# Patient Record
Sex: FEMALE | Race: WHITE
[De-identification: names, ages, dates, MRNs, and addresses within clinical notes are randomized per-mention and may not be internally consistent; named-entity substitution may affect disease eponyms.]

---

## 2018-12-06 ENCOUNTER — HOSPITAL ENCOUNTER (OUTPATIENT)
Dept: HOSPITAL 75 - PREOP | Age: 38
Discharge: HOME | End: 2018-12-06
Attending: OBSTETRICS & GYNECOLOGY
Payer: COMMERCIAL

## 2018-12-06 VITALS — WEIGHT: 250 LBS | HEIGHT: 66 IN | BODY MASS INDEX: 40.18 KG/M2

## 2018-12-06 VITALS — DIASTOLIC BLOOD PRESSURE: 67 MMHG | SYSTOLIC BLOOD PRESSURE: 114 MMHG

## 2018-12-06 DIAGNOSIS — Z11.2: ICD-10-CM

## 2018-12-06 DIAGNOSIS — N94.6: ICD-10-CM

## 2018-12-06 DIAGNOSIS — N92.0: ICD-10-CM

## 2018-12-06 DIAGNOSIS — N81.4: ICD-10-CM

## 2018-12-06 DIAGNOSIS — Z01.812: Primary | ICD-10-CM

## 2018-12-06 LAB
APTT PPP: YELLOW S
BACTERIA #/AREA URNS HPF: (no result) /HPF
BASOPHILS # BLD AUTO: 0.1 10^3/UL (ref 0–0.1)
BASOPHILS NFR BLD AUTO: 1 % (ref 0–10)
BILIRUB UR QL STRIP: NEGATIVE
EOSINOPHIL # BLD AUTO: 0.1 10^3/UL (ref 0–0.3)
EOSINOPHIL NFR BLD AUTO: 1 % (ref 0–10)
ERYTHROCYTE [DISTWIDTH] IN BLOOD BY AUTOMATED COUNT: 14.5 % (ref 10–14.5)
FIBRINOGEN PPP-MCNC: CLEAR MG/DL
GLUCOSE UR STRIP-MCNC: NEGATIVE MG/DL
HCT VFR BLD CALC: 44 % (ref 35–52)
HGB BLD-MCNC: 15 G/DL (ref 11.5–16)
KETONES UR QL STRIP: NEGATIVE
LEUKOCYTE ESTERASE UR QL STRIP: (no result)
LYMPHOCYTES # BLD AUTO: 2.2 X 10^3 (ref 1–4)
LYMPHOCYTES NFR BLD AUTO: 21 % (ref 12–44)
MANUAL DIFFERENTIAL PERFORMED BLD QL: NO
MCH RBC QN AUTO: 30 PG (ref 25–34)
MCHC RBC AUTO-ENTMCNC: 34 G/DL (ref 32–36)
MCV RBC AUTO: 88 FL (ref 80–99)
MONOCYTES # BLD AUTO: 1.1 X 10^3 (ref 0–1)
MONOCYTES NFR BLD AUTO: 10 % (ref 0–12)
NEUTROPHILS # BLD AUTO: 6.9 X 10^3 (ref 1.8–7.8)
NEUTROPHILS NFR BLD AUTO: 67 % (ref 42–75)
NITRITE UR QL STRIP: NEGATIVE
PH UR STRIP: 5 [PH] (ref 5–9)
PLATELET # BLD: 312 10^3/UL (ref 130–400)
PMV BLD AUTO: 9.7 FL (ref 7.4–10.4)
PROT UR QL STRIP: NEGATIVE
RBC # BLD AUTO: 4.98 10^6/UL (ref 4.35–5.85)
RBC #/AREA URNS HPF: (no result) /HPF
SP GR UR STRIP: 1.02 (ref 1.02–1.02)
UROBILINOGEN UR-MCNC: NORMAL MG/DL
WBC # BLD AUTO: 10.3 10^3/UL (ref 4.3–11)
WBC #/AREA URNS HPF: (no result) /HPF

## 2018-12-06 PROCEDURE — 87081 CULTURE SCREEN ONLY: CPT

## 2018-12-06 PROCEDURE — 86850 RBC ANTIBODY SCREEN: CPT

## 2018-12-06 PROCEDURE — 85025 COMPLETE CBC W/AUTO DIFF WBC: CPT

## 2018-12-06 PROCEDURE — 86900 BLOOD TYPING SEROLOGIC ABO: CPT

## 2018-12-06 PROCEDURE — 36415 COLL VENOUS BLD VENIPUNCTURE: CPT

## 2018-12-06 PROCEDURE — 87088 URINE BACTERIA CULTURE: CPT

## 2018-12-06 PROCEDURE — 81000 URINALYSIS NONAUTO W/SCOPE: CPT

## 2018-12-06 PROCEDURE — 86901 BLOOD TYPING SEROLOGIC RH(D): CPT

## 2018-12-11 ENCOUNTER — HOSPITAL ENCOUNTER (OUTPATIENT)
Dept: HOSPITAL 75 - SDC | Age: 38
Discharge: HOME | End: 2018-12-11
Attending: OBSTETRICS & GYNECOLOGY
Payer: MEDICAID

## 2018-12-11 VITALS — SYSTOLIC BLOOD PRESSURE: 140 MMHG | DIASTOLIC BLOOD PRESSURE: 85 MMHG

## 2018-12-11 VITALS — WEIGHT: 250 LBS | BODY MASS INDEX: 40.18 KG/M2 | HEIGHT: 66 IN

## 2018-12-11 VITALS — DIASTOLIC BLOOD PRESSURE: 89 MMHG | SYSTOLIC BLOOD PRESSURE: 148 MMHG

## 2018-12-11 VITALS — DIASTOLIC BLOOD PRESSURE: 58 MMHG | SYSTOLIC BLOOD PRESSURE: 126 MMHG

## 2018-12-11 DIAGNOSIS — F17.210: ICD-10-CM

## 2018-12-11 DIAGNOSIS — N87.0: ICD-10-CM

## 2018-12-11 DIAGNOSIS — N94.5: ICD-10-CM

## 2018-12-11 DIAGNOSIS — J45.909: ICD-10-CM

## 2018-12-11 DIAGNOSIS — E66.01: ICD-10-CM

## 2018-12-11 DIAGNOSIS — Z79.899: ICD-10-CM

## 2018-12-11 DIAGNOSIS — N81.2: ICD-10-CM

## 2018-12-11 DIAGNOSIS — D25.2: ICD-10-CM

## 2018-12-11 DIAGNOSIS — N83.8: ICD-10-CM

## 2018-12-11 DIAGNOSIS — N92.0: Primary | ICD-10-CM

## 2018-12-11 PROCEDURE — 94664 DEMO&/EVAL PT USE INHALER: CPT

## 2018-12-11 PROCEDURE — 86901 BLOOD TYPING SEROLOGIC RH(D): CPT

## 2018-12-11 PROCEDURE — 86850 RBC ANTIBODY SCREEN: CPT

## 2018-12-11 PROCEDURE — 84703 CHORIONIC GONADOTROPIN ASSAY: CPT

## 2018-12-11 PROCEDURE — 86900 BLOOD TYPING SEROLOGIC ABO: CPT

## 2018-12-11 PROCEDURE — 36415 COLL VENOUS BLD VENIPUNCTURE: CPT

## 2018-12-11 PROCEDURE — 88307 TISSUE EXAM BY PATHOLOGIST: CPT

## 2018-12-11 RX ADMIN — SODIUM CHLORIDE, SODIUM LACTATE, POTASSIUM CHLORIDE, AND CALCIUM CHLORIDE PRN MLS/HR: 600; 310; 30; 20 INJECTION, SOLUTION INTRAVENOUS at 10:07

## 2018-12-11 RX ADMIN — HYDROCODONE BITARTRATE AND ACETAMINOPHEN PRN EA: 7.5; 325 TABLET ORAL at 15:32

## 2018-12-11 RX ADMIN — SODIUM CHLORIDE, SODIUM LACTATE, POTASSIUM CHLORIDE, AND CALCIUM CHLORIDE PRN MLS/HR: 600; 310; 30; 20 INJECTION, SOLUTION INTRAVENOUS at 08:10

## 2018-12-11 RX ADMIN — HYDROCODONE BITARTRATE AND ACETAMINOPHEN PRN EA: 7.5; 325 TABLET ORAL at 15:21

## 2018-12-11 RX ADMIN — SODIUM CHLORIDE, SODIUM LACTATE, POTASSIUM CHLORIDE, AND CALCIUM CHLORIDE PRN MLS/HR: 600; 310; 30; 20 INJECTION, SOLUTION INTRAVENOUS at 06:54

## 2018-12-11 NOTE — XMS REPORT
[facility]

 Created on: 2018



Kori Barksdale

External Reference #: [id number]

: 1980

Sex: Female



Demographics







 Address  [address] [place]  [address]

 

 Preferred Language  Unknown

 

 Marital Status  Unknown

 

 Rastafarian Affiliation  Unknown

 

 Race  Unknown

 

 Ethnic Group  Unknown





Author







 Author  HERBER AZEVEDO

 

 [facility]

 

 Address  [address] [place]  [address]



 

 Phone  [phone]







Care Team Providers







 Care Team Member Name  Role  Phone

 

 HERBER AZEVEDO  Unavailable  [phone]







PROBLEMS







 Type  Condition  ICD9-CM Code  DLL82-GW Code  Onset Dates  Condition Status  
SNOMED Code

 

 Problem  Strep throat     J02.0     Active  67888151

 

 Problem  Bipolar depression     F31.30     Active  53560144

 

 Problem  Social anxiety disorder     F40.10     Active  32990695

 

 Problem  Moderate major depression     F32.1     Active  588909

 

 Problem  Meralgia paresthetica of left side     G57.12     Active  93961461

 

 Problem  Congenital defect of spine     Q76.49     Active  582369884

 

 Problem  Recurrent major depressive disorder, in partial remission     F33.41 
    Active  02751083







ALLERGIES







 Substance  Reaction  Event Type  Date  Status

 

 Depo-Provera  fluid retention  Drug Allergy    Active







ENCOUNTERS







 Encounter  Location  Date  Diagnosis

 

 [facility]  [address], [place] [address]
50[address]  07 Aug, 2018   

 

 [facility]  [id number] [address]-
2877    Bipolar depression F31.30 and Social anxiety disorder F40.10

 

 [facility]  [address] [id number], [place] [id number]  
15 Justo, 2018  Congenital defect of spine Q76.49

 

 [facility]  [address] [id number], [place] [id number]  
  Moderate major depression F32.1

 

 [facility]  [address] [facility] [address] [id number], [place] [id number]  
22 May, 2018  Recurrent major depressive disorder, in partial remission F33.41

 

 [facility]  [address] [id number], [place] [id number]  
09 May, 2018  Recurrent major depressive disorder, in partial remission F33.41

 

 [facility]  [address] [id number], [place] [id number]  
04 May, 2018  Moderate major depression F32.1 and Thrush B37.0

 

 [facility]  [address] [id number], [place] [id number]  
   

 

 [facility]  [address] [id number], [place] [id number]  
  Moderate major depression F32.1

 

 [facility]  [address] [id number], [place] [address]  
  Moderate major depression F32.1 and Thrush B37.0

 

 [facility]  [address] [id number], [place] [id number]  
   

 

 [facility]  [address] [place] [address]    Right acute otitis media H66.91 ; Cough R05 and Tobacco 
abuse Z72.0

 

 [facility]  [address] [facility] [address] [id number], [place] [id number]  
  Moderate major depression F32.1 and Acute hemorrhoid K64.9

 

 [facility]  [address] [facility] [address] [id number], [place] [id number]  
14 Dec, 2017  Moderate major depression F32.1 ; History of asthma Z87.09 ; 
Tobacco use Z72.0 ; Tobacco abuse counseling Z71.6 ; Meralgia paresthetica of 
left side G57.12 ; Sore throat J02.9 and Strep throat J02.0

 

 [facility] [id number], [place] [id number]  
12 Dec, 2017   

 

 [facility]  [id number] [place] [address]
-[phone]    Pharyngitis due to other organism J02.8

 

 [facility]  [id number] [place] [address]-
5351  28 Mar, 2017   

 

 [facility]  [address] [id number] [address]
7[address]  14 Mar, 2017  Routine gynecological examination Z01.419

 

 [facility] [address] [id number] [place] [id number]  
04 Mar, 2017  Acute recurrent frontal sinusitis J01.11 and Fever and chills 
R50.9

 

 [facility]  [address]00565100[place], [place] [address]  10 2017  Fever, unspecified fever cause R50.9 and Acute non-
recurrent maxillary sinusitis J01.00

 

 [facility]  [id number] [place] [address]-
4861  03 Oct, 2016   

 

 [facility] [address] [id number] [place] [id number]  
  Strep throat J02.0

 

 [address] [place] [id number]  
  Conjunctivitis of right eye, unspecified conjunctivitis type 
H10.9 and Strep throat J02.0

 

 [address] [id number] [place] [id number]  
  Sore throat J02.9

 

 [facility]  [address]00565100[place], [place] [address]  17 Dec, 2009   

 

 [address] [place] [address]-
3829  17 Dec, 2009   

 

 [facility]  [id number] [place] [address]-
9251  19 May, 2009   







IMMUNIZATIONS

No Known Immunizations



SOCIAL HISTORY

Never Assessed



REASON FOR VISIT

Depression-c/o decreased motivation, crying, the last couple weeks.  Karine CALIX



PLAN OF CARE







 Activity  Details









  









 Follow Up  4 Weeks Reason:depression







VITAL SIGNS







 Height  66 in  2018

 

 Weight  241.8 lbs  2018

 

 Temperature  99.0 degrees Fahrenheit  2018

 

 Heart Rate  94 bpm  2018

 

 Respiratory Rate  18   2018

 

 BMI  39.02 kg/m2  2018

 

 Blood pressure systolic  133 mmHg  2018

 

 Blood pressure diastolic  83 mmHg  2018







MEDICATIONS







 Medication  Instructions  Dosage  Frequency  Start Date  End Date  Duration  
Status

 

 Nystatin 409476 UNIT/ML  Mouth/Throat Four times a day  4 ml  6h    14 days  Active

 

 Pristiq 50 mg  Orally Once a day  1 tablet  24h  14 Dec, 2017        Active

 

 ProAir  (90 Base) MCG/ACT  Inhalation every 4 hrs  2 puffs as needed  
4h           Active

 

 Aripiprazole 5 mg  Orally Once a day  1 tablet  24h          Active







RESULTS

No Results



PROCEDURES

No Known procedures



INSTRUCTIONS





MEDICATIONS ADMINISTERED

No Known Medications



MEDICAL (GENERAL) HISTORY







 Type  Description  Date

 

 Medical History  asthma   

 

 Medical History  depression ( prozac, pristiq, zoloft, celexa, wellbutrin)   

 

 Medical History  anxiety   

 

 Medical History  Chronic low back pain- missing vertebra   

 

 Medical History  Spina befida - close   

 

 Medical History  Degenerative disc disease   

 

 Surgical History  cholecystectomy   

 

 Hospitalization History  childbirth

## 2018-12-11 NOTE — XMS REPORT
[facility]

 Created on: 2018



Kori Barksdale

External Reference #: [id number]

: 1980

Sex: Female



Demographics







 Address  [address] [place]  [address]

 

 Preferred Language  Unknown

 

 Marital Status  Unknown

 

 Restoration Affiliation  Unknown

 

 Race  Unknown

 

 Ethnic Group  Unknown





Author







 Author  HERBER AZEVEDO

 

 [facility]

 

 Address  [address] [place]  [address]



 

 Phone  [phone]







Care Team Providers







 Care Team Member Name  Role  Phone

 

 HERBER AZEVEDO  Unavailable  [phone]







PROBLEMS







 Type  Condition  ICD9-CM Code  ATY06-CB Code  Onset Dates  Condition Status  
SNOMED Code

 

 Problem  Strep throat     J02.0     Active  40003760

 

 Problem  Bipolar depression     F31.30     Active  09777402

 

 Problem  Social anxiety disorder     F40.10     Active  85357095

 

 Problem  Moderate major depression     F32.1     Active  294987

 

 Problem  Meralgia paresthetica of left side     G57.12     Active  68713151

 

 Problem  Congenital defect of spine     Q76.49     Active  340544567

 

 Problem  Recurrent major depressive disorder, in partial remission     F33.41 
    Active  98771734







ALLERGIES







 Substance  Reaction  Event Type  Date  Status

 

 Depo-Provera  fluid retention  Drug Allergy  04 May, 2018  Active







ENCOUNTERS







 Encounter  Location  Date  Diagnosis

 

 [facility]  [address]00565100[place] [address]  20 Sep, 2018   

 

 [facility]  [address] [place] [address]-
2886  07 Aug, 2018  Bipolar depression F31.30 and Social anxiety disorder F40.10

 

 [address]  [address] [id number], [place] [id number]  
04 Aug, 2018  Hordeolum internum of right upper eyelid H00.021

 

 [facility]  [id number] [address]-
3271    Bipolar depression F31.30 and Social anxiety disorder F40.10

 

 [facility]  [address] [id number], [place] [id number]  
15 Justo, 2018  Congenital defect of spine Q76.49

 

 [address] [address]  [address] [id number], [place] [id number]  
  Moderate major depression F32.1

 

 [facility]  [address] [id number], [place] [id number]  
22 May, 2018  Recurrent major depressive disorder, in partial remission F33.41

 

 [facility]  [address] [id number], [place] [id number]  
09 May, 2018  Recurrent major depressive disorder, in partial remission F33.41

 

 [facility]  [address] [id number], [place] [id number]  
04 May, 2018  Moderate major depression F32.1 and Thrush B37.0

 

 [facility]  [address] [id number], [place] [id number]  
   

 

 [facility]  [address] [id number], [place] [id number]  
  Moderate major depression F32.1

 

 [facility]  [address] [id number], [place] [id number]  
  Moderate major depression F32.1 and Thrush B37.0

 

 [facility]  [address] [id number], [place] [id number]  
   

 

 [facility]  [address] [facility] [id number] [place] [address]    Right acute otitis media H66.91 ; Cough R05 and Tobacco 
abuse Z72.0

 

 [facility]  [address] [id number], [place] [address]  
  Moderate major depression F32.1 and Acute hemorrhoid K64.9

 

 [facility]  [address] [id number], [place] [address]  
14 Dec, 2017  Moderate major depression F32.1 ; History of asthma Z87.09 ; 
Tobacco use Z72.0 ; Tobacco abuse counseling Z71.6 ; Meralgia paresthetica of 
left side G57.12 ; Sore throat J02.9 and Strep throat J02.0

 

 [facility]  [address] [id number], [place] [address]  
12 Dec, 2017   

 

 [facility]  [address]00565100[place] [address]    Pharyngitis due to other organism J02.8

 

 [facility]  [address]00565100[place] [address]-
2430  28 Mar, 2017   

 

 [facility]  [id number] [place] [address]-
2377  14 Mar, 2017  Routine gynecological examination Z01.419

 

 [address] [id number], [place] [address]  
04 Mar, 2017  Acute recurrent frontal sinusitis J01.11 and Fever and chills 
R50.9

 

 [facility]  [address]00565100[place] [address]
-[address]  10 2017  Fever, unspecified fever cause R50.9 and Acute non-
recurrent maxillary sinusitis J01.00

 

 [address]00565100[place] [address]-
[address]32  03 Oct, 2016   

 

 [address] [id number], [place] [address]  
  Strep throat J02.0

 

 [address] [id number], [place] [id number]  
  Conjunctivitis of right eye, unspecified conjunctivitis type 
H10.9 and Strep throat J02.0

 

 [address] [id number], [place] [id number]  
  Sore throat J02.9

 

 [address]00565100[place] [address]-
6463  17 Dec, 2009   

 

 [address]00565100[place] [address]-
0095  17 Dec, 2009   

 

 [facility]  [address]00565100[place] [address]-
6337  19 May, 2009   







IMMUNIZATIONS

No Known Immunizations



SOCIAL HISTORY

Never Assessed



REASON FOR VISIT

Depression check up, doing better after going to QOD on Abilify, still has some 
nausea, Pristiq working well with Olivefy---JEREL caal



PLAN OF CARE







 Activity  Details









  









 Follow Up  1 Year, prn Reason:







VITAL SIGNS







 Height  66 in  2018

 

 Weight  238.8 lbs  2018

 

 Temperature  97.9 degrees Fahrenheit  2018

 

 Heart Rate  102 bpm  2018

 

 Respiratory Rate  18   2018

 

 BMI  38.54 kg/m2  2018

 

 Blood pressure systolic  130 mmHg  2018

 

 Blood pressure diastolic  68 mmHg  2018







MEDICATIONS







 Medication  Instructions  Dosage  Frequency  Start Date  End Date  Duration  
Status

 

 Pristiq 50 mg  Orally Once a day  1 tablet  24h  14 Dec, 2017        Active

 

 Diflucan 150 MG  Orally take today and repeat in 3 days  1 tablet     04 May, 
2018        Active

 

 ProAir  (90 Base) MCG/ACT  Inhalation every 4 hrs  2 puffs as needed  
4h           Active

 

 Nystatin 639701 UNIT/ML  Mouth/Throat Four times a day  4 ml  6h  
     14 days  Active

 

 Aripiprazole 5 mg  Orally every other day  1 tablet          90 
days  Active







RESULTS

No Results



PROCEDURES

No Known procedures



INSTRUCTIONS





MEDICATIONS ADMINISTERED

No Known Medications



MEDICAL (GENERAL) HISTORY







 Type  Description  Date

 

 Medical History  asthma   

 

 Medical History  depression ( prozac, pristiq, zoloft, celexa, wellbutrin)   

 

 Medical History  anxiety   

 

 Medical History  Chronic low back pain- missing vertebra   

 

 Medical History  Spina befida - close   

 

 Medical History  Degenerative disc disease   

 

 Surgical History  cholecystectomy   

 

 Hospitalization History  childbirth

## 2018-12-11 NOTE — XMS REPORT
[facility]

 Created on: 2018



Kori Barksdale

External Reference #: [id number]

: 1980

Sex: Female



Demographics







 Address  [address] [place]  [address]

 

 Preferred Language  Unknown

 

 Marital Status  Unknown

 

 Mandaen Affiliation  Unknown

 

 Race  Unknown

 

 Ethnic Group  Unknown





Author







 Author  BELA MCKEON

 

 [facility]

 

 Address  [address] [place]  [address]



 

 Phone  [phone]







Care Team Providers







 Care Team Member Name  Role  Phone

 

 LINDA  BELA  Unavailable  [phone]







PROBLEMS







 Type  Condition  ICD9-CM Code  VPQ01-NL Code  Onset Dates  Condition Status  
SNOMED Code

 

 Problem  Strep throat     J02.0     Active  60198843

 

 Problem  Bipolar depression     F31.30     Active  39329244

 

 Problem  Social anxiety disorder     F40.10     Active  88549339

 

 Problem  Moderate major depression     F32.1     Active  836431

 

 Problem  Meralgia paresthetica of left side     G57.12     Active  91188405

 

 Problem  Congenital defect of spine     Q76.49     Active  439330808

 

 Problem  Recurrent major depressive disorder, in partial remission     F33.41 
    Active  73100375







ALLERGIES







 Substance  Reaction  Event Type  Date  Status

 

 Depo-Provera  fluid retention  Drug Allergy    Active

 

 Abilify  GI distress  Drug Allergy    Active







ENCOUNTERS







 Encounter  Location  Date  Diagnosis

 

 [facility]  [address] [place] [address]-
3128  20 Sep, 2018   

 

 [facility]  [address] [place] [address]-
[address]  29 Aug, 2018  Bipolar depression F31.30

 

 [facility]  [id number] [place] [address]-
[address]  07 Aug, 2018  Bipolar depression F31.30 and Social anxiety disorder F40.10

 

 [address]  [address] [id number], [place] [id number]  
04 Aug, 2018  Hordeolum internum of right upper eyelid H00.021

 

 [facility]  [address] [place] [address]-
5532    Bipolar depression F31.30 and Social anxiety disorder F40.10

 

 [facility]  [address] [id number], [place] [address]  
15 Justo, 2018  Congenital defect of spine Q76.49

 

 [facility]  [address]  [address] [id number], [place] [id number]  
  Moderate major depression F32.1

 

 [facility]  [address]  [address] [id number], [place] [address]  
22 May, 2018  Recurrent major depressive disorder, in partial remission F33.41

 

 [facility]  [address] [id number], [place] [address]  
09 May, 2018  Recurrent major depressive disorder, in partial remission F33.41

 

 [facility]  [address]  [address] [id number], [place] [id number]  
04 May, 2018  Moderate major depression F32.1 and Thrush B37.0

 

 [facility]  [address]  [address] [id number], [place] [id number]  
   

 

 [facility]  [address]  [address] [id number], [place] [id number]  
  Moderate major depression F32.1

 

 [facility]  [address]  [address] [id number], [place] [id number]  
  Moderate major depression F32.1 and Thrush B37.0

 

 [facility]  [address]  [address] [id number], [place] [address]  
   

 

 [facility]  [address] [facility] [id number], [place] [address]
-[address]    Right acute otitis media H66.91 ; Cough R05 and Tobacco 
abuse Z72.0

 

 [facility]  [address]  [address] [id number], [place] [id number]  
  Moderate major depression F32.1 and Acute hemorrhoid K64.9

 

 [facility]  [address] [id number], [place] [id number]  
14 Dec, 2017  Moderate major depression F32.1 ; History of asthma Z87.09 ; 
Tobacco use Z72.0 ; Tobacco abuse counseling Z71.6 ; Meralgia paresthetica of 
left side G57.12 ; Sore throat J02.9 and Strep throat J02.0

 

 [address]00565100[place], [place] [id number]  
12 Dec, 2017   

 

 [facility]  [address] [place] [address]    Pharyngitis due to other organism J02.8

 

 [facility]  [id number] [place] [address]-
2341  28 Mar, 2017   

 

 [place] [id number] [place] [address]-
3921  14 Mar, 2017  Routine gynecological examination Z01.419

 

 [address] [place] [id number]  
04 Mar, 2017  Acute recurrent frontal sinusitis J01.11 and Fever and chills 
R50.9

 

 [facility]  [id number] [place] [address]
-[address]  10 2017  Fever, unspecified fever cause R50.9 and Acute non-
recurrent maxillary sinusitis J01.00

 

 [id number] [place] [address]-
6188  03 Oct, 2016   

 

 [address] [place] [id number]  
  Strep throat J02.0

 

 [address] [place] [id number]  
  Conjunctivitis of right eye, unspecified conjunctivitis type 
H10.9 and Strep throat J02.0

 

 [address] [place] [id number]  
  Sore throat J02.9

 

 [facility]  [address] [place] [address]-
6490  17 Dec, 2009   

 

 [facility]  [id number] [place] [address]-
0865  17 Dec, 2009   

 

 [facility]  [address] [facility] [id number] [place] [address]-
4031  19 May, 2009   







IMMUNIZATIONS

No Known Immunizations



SOCIAL HISTORY

Never Assessed



REASON FOR VISIT

BH intake WB-MA



PLAN OF CARE







 Activity  Details









  









 Follow Up  4 Weeks Reason:







VITAL SIGNS







 Height  67 in  2018

 

 Weight  238 lbs  2018

 

 Heart Rate  84 bpm  2018

 

 Respiratory Rate  18   2018

 

 BMI  37.27 kg/m2  2018

 

 Blood pressure systolic  128 mmHg  2018

 

 Blood pressure diastolic  80 mmHg  2018







MEDICATIONS







 Medication  Instructions  Dosage  Frequency  Start Date  End Date  Duration  
Status

 

 Pristiq 50 mg  Orally Once a day  1 tablet  24h  14 Dec, 2017        Active

 

 Lamictal 25 MG  Orally for 2 weeks. On  take 2 tablets at bedtime and 
continue  1 tablets          30 day(s)  Active

 

 ProAir  (90 Base) MCG/ACT  Inhalation every 4 hrs  2 puffs as needed  
4h           Active







RESULTS

No Results



PROCEDURES

No Known procedures



INSTRUCTIONS





MEDICATIONS ADMINISTERED

No Known Medications



MEDICAL (GENERAL) HISTORY







 Type  Description  Date

 

 Medical History  asthma   

 

 Medical History  depression ( prozac, pristiq, zoloft, celexa, wellbutrin)   

 

 Medical History  anxiety   

 

 Medical History  Chronic low back pain- missing vertebra   

 

 Medical History  Spina befida - close   

 

 Medical History  Degenerative disc disease   

 

 Surgical History  cholecystectomy   

 

 Hospitalization History  childbirth

## 2018-12-11 NOTE — XMS REPORT
Continuity of Care Document

 Created on: 2018



CLEVELAND ACOSTA

External Reference #: [id number]

: 1980

Sex: Female



Demographics







 Address  [address]

[place]  [address]

 

 Home Phone  [phone] x

 

 Preferred Language  Unknown

 

 Marital Status  Unknown

 

 Catholic Affiliation  Unknown

 

 Race  Unknown

 

 Ethnic Group  Unknown





Author







 Author  [facility]

 

 Organization  [facility]

 

 Address  Unknown

 

 Phone  Unavailable



              



Allergies

      





 Active            Description            Code            Type            
Severity            Reaction            Onset            Reported/Identified   
         Relationship to Patient            Clinical Status        

 

 Yes            erythromycin base            [id number]            Drug Allergy
            Unknown            N/A                         2013          
                        

 

 Yes            medroxyprogesterone acetate            [id number]            
Drug Allergy            Unknown            N/A                         2013                                  



                    



Medications

      



There is no data.                  



Problems

      





 Date Dx Coded            Attending            Type            Code            
Diagnosis            Diagnosed By        

 

 2011                         Ot            847.0            SPRAIN OF 
NECK                     

 

 2011                         Ot            850.0            CONCUSSION W/
O COMA                     

 

 2011                         Ot            959.01            HEAD INJURY
, NOS                     

 

 2011                         Ot            E000.8            OTHER 
EXTERNAL CAUSE STATUS                     

 

 2011                         Ot            E812.0            MV 
COLLISION NOS-                     

 

 2013            PALOMO KITCHEN MD            Ot            640.03      
      THREATEN ABORT-ANTEPART                     

 

 2013            PALOMO KITCHEN MD            Ot            643.03      
      MILD HYPEREMESIS-ANTEPAR                     

 

 2013            PALOMO KITCHEN MD            Ot            649.53      
      SPOTTING COMP PREGNANCY, ANTEPARTUM COND                     

 

 10/05/2013            RANCHO JONES MD            Ot            
644.03            THRT DANNY LABOR-ANTEPART                     

 

 2013            RAJNI TAYLOR DO            Ot            623.5     
       NONINFECT VAG LEUKORRHEA                     

 

 2013            RAJNI TAYLOR DO            Ot            625.9     
       FEM GENITAL SYMPTOMS NOS                     

 

 2013            RAJNI TAYLOR DO            Ot            646.83    
        PREG COMPL NEC-ANTEPART                     

 

 2013            RAJNI TAYLOR DO            Ot            648.83    
        ABN GLUCOSE-ANTEPARTUM                     

 

 2013            RAJNI TAYLOR DO            Ot            654.73    
        ABNORM VAGINA-ANTEPARTUM                     

 

 2013            RAJNI TAYLOR DO            Ot            V23.41    
        PREG W HISTORY OF PRE-TERM LABOR                     

 

 2013            RANCOH JONES MD            Ot            
288.60            LEUKOCYTOSIS, UNSPECIFIED                     

 

 2013            RANCHO JONES MD            Ot            
644.03            THRT DANNY LABOR-ANTEPART                     

 

 2013            RANCHO JONES MD            Ot            
646.83            PREG COMPL NEC-ANTEPART                     

 

 2013            RANCHO JONES MD            Ot            
655.73            DECR FETAL MOVEMNT ANTEPARTUM CONDITION                      

 

 2013            RANCHO JONES MD, Ot            
V23.41            PREG W HISTORY OF PRE-TERM LABOR                     

 

 2013            RANCHO JONES MD            Ot            
644.03            THRT DANNY LABOR-ANTEPART                     

 

 2013            RANCHO JONES MD, Ot            
644.03            THRT DANNY LABOR-ANTEPART                     

 

 12/15/2013            RANCHO JONES MD, Ot            
657.01            POLYHYDRAMNIOS,DEL W OR W/O MENTN ANTEPA                     

 

 12/15/2013            RANCHO JONES MD            Ot            V27.0
            DELIVER-SINGLE LIVEBORN                     

 

 12/10/2018            QUIN ALTAMIRANO DO            Ot            N81.4        
    UTEROVAGINAL PROLAPSE, UNSPECIFIED                     

 

 12/10/2018            CHERELLE ALTAMIRANO DOA C            Ot            N92.0        
    EXCESSIVE AND FREQUENT MENSTRUATION WITH                     

 

 12/10/2018            CHERELLE ALTAIMRANO DOA C            Ot            N94.6        
    DYSMENORRHEA, UNSPECIFIED                     

 

 12/10/2018            CHERELLE ALTAMIRANO DOA PB            Ot            Z01.812      
      ENCOUNTER FOR PREPROCEDURAL LABORATORY E                     

 

 12/10/2018            QUIN ALTAMIRANO DO            Ot            Z11.2        
    ENCOUNTER FOR SCREENING FOR OTHER BACTER                     



                                                                          



Procedures

      





 Code            Description            Performed By            Performed On   
     

 

             73.59                                  MANUAL ASSIST DELIV NEC    
                               2013        



                  



Results

      





 Test            Result            Range        









 Pap Lb, HPV-hr - 17 11:20         









 HPV, high-risk            Negative             Negative        

 

 DIAGNOSIS:            Comment                      

 

 Specimen adequacy:            Comment                      

 

 Clinician provided ICD10:            Comment                      

 

 Performed by:            Comment                      

 

 Electronically signed by:            Comment                      

 

 .            .                      

 

 Note:            Comment                      









 Aerobic Bacterial Culture - 17 11:20         









 Aerobic Bacterial Culture            Note                      









 Genital Culture, Routine - 17 11:20         









 Genital Culture, Routine            Note                      









 THYROID ANALYZER - 17 09:26         









 TSH            1.24 mIU/L            NRG        









 Methicillin resistant Staphylococcus aureus (MRSA) screening culture -  11:55         









 Methicillin resistant Staphylococcus aureus (MRSA) screening culture          
  NEG             NRG        









 Complete blood count (CBC) with automated white blood cell (WBC) differential 
- 18 12:00         









 Blood leukocytes automated count (number/volume)            10.3 10*3/uL      
      4.3-11.0        

 

 Blood erythrocytes automated count (number/volume)            4.98 10*6/uL    
        4.35-5.85        

 

 Venous blood hemoglobin measurement (mass/volume)            15.0 g/dL        
    11.5-16.0        

 

 Blood hematocrit (volume fraction)            44 %            35-52        

 

 Automated erythrocyte mean corpuscular volume            88 [foz_us]          
  80-99        

 

 Automated erythrocyte mean corpuscular hemoglobin (mass per erythrocyte)      
      30 pg            25-34        

 

 Automated erythrocyte mean corpuscular hemoglobin concentration measurement (
mass/volume)            34 g/dL            32-36        

 

 Automated erythrocyte distribution width ratio            14.5 %            
10.0-14.5        

 

 Automated blood platelet count (count/volume)            312 10*3/uL          
  130-400        

 

 Automated blood platelet mean volume measurement            9.7 [foz_us]      
      7.4-10.4        

 

 Automated blood neutrophils/100 leukocytes            67 %            42-75   
     

 

 Automated blood lymphocytes/100 leukocytes            21 %            12-44   
     

 

 Blood monocytes/100 leukocytes            10 %            0-12        

 

 Automated blood eosinophils/100 leukocytes            1 %            0-10     
   

 

 Automated blood basophils/100 leukocytes            1 %            0-10        

 

 Blood neutrophils automated count (number/volume)            6.9 10*3         
   1.8-7.8        

 

 Blood lymphocytes automated count (number/volume)            2.2 10*3         
   1.0-4.0        

 

 Blood monocytes automated count (number/volume)            1.1 10*3            
0.0-1.0        

 

 Automated eosinophil count            0.1 10*3/uL            0.0-0.3        

 

 Automated blood basophil count (count/volume)            0.1 10*3/uL          
  0.0-0.1        









 Blood type T Indirect antibody screen panel - 18 12:00         









 ABO+Rh group            ABP             NRG        

 

 Blood group antibody screen            NEGATIVE             NRG        









 Complete urinalysis with reflex to culture - 18 12:03         









 Urine color determination            YELLOW             NRG        

 

 Urine clarity determination            CLEAR             NRG        

 

 Urine pH measurement by test strip            5             5-9        

 

 Specific gravity of urine by test strip            1.020             1.016-
1.022        

 

 Urine protein assay by test strip, semi-quantitative            NEGATIVE      
       NEGATIVE        

 

 Urine glucose detection by automated test strip            NEGATIVE           
  NEGATIVE        

 

 Erythrocytes detection in urine sediment by light microscopy            2+    
         NEGATIVE        

 

 Urine ketones detection by automated test strip            NEGATIVE           
  NEGATIVE        

 

 Urine nitrite detection by test strip            NEGATIVE             NEGATIVE
        

 

 Urine total bilirubin detection by test strip            NEGATIVE             
NEGATIVE        

 

 Urine urobilinogen measurement by automated test strip (mass/volume)          
  NORMAL             NORMAL        

 

 Urine leukocyte esterase detection by dipstick            2+             
NEGATIVE        

 

 Automated urine sediment erythrocyte count by microscopy (number/high power 
field)             [HPF]            NRG        

 

 Automated urine sediment leukocyte count by microscopy (number/high power field
)             [HPF]            NRG        

 

 Bacteria detection in urine sediment by light microscopy            FEW       
      NRG        

 

 Squamous epithelial cells detection in urine sediment by light microscopy     
       10-25             NRG        

 

 Crystals detection in urine sediment by light microscopy            NONE      
       NRG        

 

 Casts detection in urine sediment by light microscopy            NONE         
    NRG        

 

 Mucus detection in urine sediment by light microscopy            NEGATIVE     
        NRG        

 

 Complete urinalysis with reflex to culture            YES             NRG     
   









 Bacterial urine culture - 18 12:03         









 Bacterial urine culture            SEE REPORT             NRG        

 

 COLONY COUNT            .             NRG        



                                



Encounters

      





 ACCT No.            Visit Date/Time            Discharge            Status    
        Pt. Type            Provider            Facility            Loc./Unit  
          Complaint        

 

 [id number]            2018 11:37:00            2018 12:10:00    
        DIS            Outpatient            QUIN ALTAMIRANO DO            [facility]            PREOP            MENORRHAGIA; UTERINE 
PROLAPSE; DYSMENORRHEA        

 

 [id number]            2013 09:42:00            12/15/2013 18:15:00    
        DIS            Inpatient            RANCHO JONES MD          
  [facility]            WS            LABOR        

 

 [id number]            2013 07:20:00            2013 09:30:00    
        DIS            Outpatient            RANCHO JONES MD         
   [facility]            WSo            LEAKING FLUID/
BLOOD        

 

 [id number]            2013 14:23:00            2013 18:10:00    
        DIS            Outpatient            RANCHO JONES MD         
   [facility]            WSo            PELVIC PAIN AND 
PRESSURE        

 

 [id number]            2013 15:55:00            2013 10:00:00    
        DIS            Inpatient            RANCHO JONES MD          
  [facility]            WS            CONTRACTIONS        

 

 [id number]            2013 09:39:00            2013 13:35:00    
        DIS            Outpatient            RAJNI TAYLOR DO            
[facility]            PRESSURE        

 

 [id number]            10/05/2013 18:00:00            10/05/2013 21:57:00    
        DIS            Outpatient            RANCHO JONES MD         
   [facility]            WSo            PAIN        

 

 [id number]            2013 12:51:00            2013 23:59:59    
        CLS            Outpatient                                              
              

 

 [id number]            2013 11:59:00            2013 13:37:00    
        DIS            Emergency            IMTIAZ MELLO, PALOMO VALDEZ            [facility]            ER            7 WEEKS PREGNANT  
CRAMPING SPOTTING DEHRYDRATION        

 

 [id number]            2018 06:00:00                         ACT       
     Outpatient            QUIN ALTAMIRANO DO            [facility]            SDC            MENORRHAGIA; DYSMENORRHEA; UTERINE PROLAPSE
        

 

 [id number]            10/13/2015 10:50:00                                   
   Document Registration                                                       
     

 

 [id number]            10/13/2015 10:50:00                                   
   Document Registration                                                       
     

 

 [id number]            2017 18:11:00                                   
   Document Registration                                                       
     

 

 [id number]            2018 08:20:00            2018 23:59:59            
CLS            Outpatient            KENDAL GRAHAM LAC                         
[facility] [place]                     

 

 [id number]            2017 08:40:00                                      
Document Registration                                                          
  

 

 [id number]            2017 13:06:00                                   
   Document Registration                                                       
     

 

 [id number]            2018 09:20:00            2018 23:59:59            
CLS            Outpatient            KENDAL GRAHAM LAC                         
[facility]                     

 

 [id number]            2017 15:09:00                                   
   Document Registration

## 2018-12-11 NOTE — PROGRESS NOTE-PRE OPERATIVE
Pre-Operative Progress Note


H&P Reviewed


The H&P was reviewed, patient examined and no changes noted.


Date Seen by Provider:  Dec 11, 2018


Time Seen by Provider:  07:25


Date H&P Reviewed:  Dec 11, 2018


Time H&P Reviewed:  07:15


Pre-Operative Diagnosis:  menorrhagia, dysmenorrhea, uterine prolapse











QUIN ALTAMIRANO DO Dec 11, 2018 07:28

## 2018-12-11 NOTE — XMS REPORT
[facility]

 Created on: 2018



Kori Barksdale

External Reference #: [id number]

: 1980

Sex: Female



Demographics







 Address  [address] [place]  [address]

 

 Preferred Language  Unknown

 

 Marital Status  Unknown

 

 Restorationist Affiliation  Unknown

 

 Race  Unknown

 

 Ethnic Group  Unknown





Author







 Author  BELA MCKEON

 

 [facility]

 

 Address  [address] [place]  [address]



 

 Phone  [phone]







Care Team Providers







 Care Team Member Name  Role  Phone

 

 LINDA  BELA  Unavailable  [phone]







PROBLEMS







 Type  Condition  ICD9-CM Code  EKH08-NQ Code  Onset Dates  Condition Status  
SNOMED Code

 

 Problem  Strep throat     J02.0     Active  24511241

 

 Problem  Bipolar depression     F31.30     Active  27681160

 

 Problem  Social anxiety disorder     F40.10     Active  23645535

 

 Problem  Moderate major depression     F32.1     Active  931776

 

 Problem  Meralgia paresthetica of left side     G57.12     Active  31612905

 

 Problem  Congenital defect of spine     Q76.49     Active  398188946

 

 Problem  Recurrent major depressive disorder, in partial remission     F33.41 
    Active  18692293







ALLERGIES

No Information



ENCOUNTERS







 Encounter  Location  Date  Diagnosis

 

 [facility] [place]  [address] [place] [phone]-
[phone]     

 

 [facility] [place]  [id number] [place] [phone]-
[phone]     

 

 [facility] [place]  [id number] [place] [phone]-
[phone]  30 Oct, 2018  Bipolar depression F31.30 and Social anxiety disorder F40.10

 

 [facility] [place]  [address] [place] [phone]-
[phone]  20 Sep, 2018  Bipolar depression F31.30 and Social anxiety disorder F40.10

 

 [facility] [place]  [address] [place] [phone]-
[phone]  29 Aug, 2018  Bipolar depression F31.30

 

 [facility] [place]  [id number], [place] [address]
[phone]  07 Aug, 2018  Bipolar depression F31.30 and Social anxiety disorder F40.10

 

 [facility]  [address] [id number] [id number]  
04 Aug, 2018  Hordeolum internum of right upper eyelid H00.021

 

 [facility]  [id number], [place] [phone]    Bipolar depression F31.30 and Social anxiety disorder F40.10

 

 [facility]  [address] [id number], [place] [id number]  
15 Justo, 2018  Congenital defect of spine Q76.49

 

 [facility]  [address] [id number][place] [id number]  
  Moderate major depression F32.1

 

 [facility]  [address] [id number], [place] [id number]  
22 May, 2018  Recurrent major depressive disorder, in partial remission F33.41

 

 [facility]  [address] [id number], [place] [id number]  
09 May, 2018  Recurrent major depressive disorder, in partial remission F33.41

 

 [facility]  [address] [id number], [place] [id number]  
04 May, 2018  Moderate major depression F32.1 and Thrush B37.0

 

 [facility]  [address] [id number], [place] [id number]  
   

 

 [facility]  [address] [id number], [place] [id number]  
  Moderate major depression F32.1

 

 [facility]  [address] [id number], [place] [id number]  
  Moderate major depression F32.1 and Thrush B37.0

 

 [facility]  [address] [id number], [place] [id number]  
   

 

 [facility]  [address] [facility] [id number][place] [address]    Right acute otitis media H66.91 ; Cough R05 and Tobacco 
abuse Z72.0

 

 [facility]  [address] [id number], [place] [id number]  
  Moderate major depression F32.1 and Acute hemorrhoid K64.9

 

 [address] [id number], [place] [id number]  
14 Dec, 2017  Moderate major depression F32.1 ; History of asthma Z87.09 ; 
Tobacco use Z72.0 ; Tobacco abuse counseling Z71.6 ; Meralgia paresthetica of 
left side G57.12 ; Sore throat J02.9 and Strep throat J02.0

 

 [address] [id number], [place] [id number]  
12 Dec, 2017   

 

 [facility]  [id number] [place] [phone]    Pharyngitis due to other organism J02.8

 

 [id number] [place] [phone]
[phone]  28 Mar, 2017   

 

 [facility] [place]  [id number] [place] [phone]-
[phone]  14 Mar, 2017  Routine gynecological examination Z01.419

 

 [address] [id number], [place] [id number]  
04 Mar, 2017  Acute recurrent frontal sinusitis J01.11 and Fever and chills 
R50.9

 

 [facility]  [address] [place] [phone]  10 2017  Fever, unspecified fever cause R50.9 and Acute non-
recurrent maxillary sinusitis J01.00

 

 [facility] [place]  [address] [address]-
[address]  03 Oct, 2016   

 

 [address] [id number], [place] [id number]  
  Strep throat J02.0

 

 [address] [id number], [place] [id number]  
  Conjunctivitis of right eye, unspecified conjunctivitis type 
H10.9 and Strep throat J02.0

 

 [address] [id number] [place], [place] [id number]  
  Sore throat J02.9

 

 [facility] [place]  [address] [facility] [id number], [place] [phone]-
24[address]  17 Dec, 2009   

 

 [facility] [place]  [address] [facility] [id number], [place] [phone]-
[address]  17 Dec, 2009   

 

 [facility] [place]  [address] [facility] [id number], [place] [phone]-
[phone]  19 May, 2009   







IMMUNIZATIONS

No Known Immunizations



SOCIAL HISTORY

Never Assessed



REASON FOR VISIT

Medication question



PLAN OF CARE





VITAL SIGNS





MEDICATIONS

Unknown Medications



RESULTS

No Results



PROCEDURES

No Known procedures



INSTRUCTIONS





MEDICATIONS ADMINISTERED

No Known Medications



MEDICAL (GENERAL) HISTORY







 Type  Description  Date

 

 Medical History  asthma   

 

 Medical History  depression ( prozac, pristiq, zoloft, celexa, wellbutrin)   

 

 Medical History  anxiety   

 

 Medical History  Chronic low back pain- missing vertebra   

 

 Medical History  Spina befida - close   

 

 Medical History  Degenerative disc disease   

 

 Surgical History  cholecystectomy   

 

 Hospitalization History  childbirth

## 2018-12-11 NOTE — XMS REPORT
[facility]

 Created on: 2018



Kori Barksdale

External Reference #: [id number]

: 1980

Sex: Female



Demographics







 Address  [address] [place]  [address]

 

 Preferred Language  Unknown

 

 Marital Status  Unknown

 

 Rastafari Affiliation  Unknown

 

 Race  Unknown

 

 Ethnic Group  Unknown





Author







 Author  HERBER AZEVEDO

 

 [facility]

 

 Address  [address] [place]  [address]



 

 Phone  [phone]







Care Team Providers







 Care Team Member Name  Role  Phone

 

 HERBER AZEVEDO  Unavailable  [phone]







PROBLEMS







 Type  Condition  ICD9-CM Code  ZOZ12-XS Code  Onset Dates  Condition Status  
SNOMED Code

 

 Problem  Recurrent major depressive disorder, in partial remission     F33.41 
    Active  76136246

 

 Problem  Moderate major depression     F32.1     Active  288670

 

 Problem  Meralgia paresthetica of left side     G57.12     Active  71554928

 

 Problem  Strep throat     J02.0     Active  88313658







ALLERGIES







 Substance  Reaction  Event Type  Date  Status

 

 Depo-Provera  fluid retention  Drug Allergy  14 Dec, 2017  Active







ENCOUNTERS







 Encounter  Location  Date  Diagnosis

 

 [facility]  [address] [id number], [place] [id number]  
15 Justo, 2018   

 

 [facility]  [address] [id number], [place] [id number]  
   

 

 [facility]  [address] [id number], [place] [id number]  
22 May, 2018  Recurrent major depressive disorder, in partial remission F33.41

 

 [facility]  [address] [id number], [place] [id number]  
09 May, 2018  Recurrent major depressive disorder, in partial remission F33.41

 

 [facility]  [address] [id number], [place] [address]  
04 May, 2018  Moderate major depression F32.1 and Thrush B37.0

 

 [facility]  [address] [id number], [place] [id number]  
   

 

 [facility]  [address] [id number], [place] [id number]  
  Moderate major depression F32.1

 

 [facility]  [address] [id number], [place] [address]  
  Moderate major depression F32.1 and Thrush B37.0

 

 [address] [id number], [place] [id number]  
   

 

 [facility]  [address]65100[place], [place] [address]    Right acute otitis media H66.91 ; Cough R05 and Tobacco 
abuse Z72.0

 

 [address] [id number], [place] [address]  
  Moderate major depression F32.1 and Acute hemorrhoid K64.9

 

 [address] [id number], [place] [address]  
14 Dec, 2017  Moderate major depression F32.1 ; History of asthma Z87.09 ; 
Tobacco use Z72.0 ; Tobacco abuse counseling Z71.6 ; Meralgia paresthetica of 
left side G57.12 ; Sore throat J02.9 and Strep throat J02.0

 

 [address] [id number], [place] [address]  
12 Dec, 2017   

 

 [facility]  [address] [place] [phone]    Pharyngitis due to other organism J02.8

 

 [place] [id number] [place] [address]-
[phone]  28 Mar, 2017   

 

 [facility]  [id number] [place] [address]  14 Mar, 2017  Routine gynecological examination Z01.419

 

 [address] [id number] [place] [id number]  
04 Mar, 2017  Acute recurrent frontal sinusitis J01.11 and Fever and chills 
R50.9

 

 [facility]  [address]00565100[place], [place] [address]  10 2017  Fever, unspecified fever cause R50.9 and Acute non-
recurrent maxillary sinusitis J01.00

 

 [facility]  [address]00565100[place] [address]  03 Oct, 2016   

 

 [facility]  [address] [facility] [address] [id number], [place] [id number]  
  Strep throat J02.0

 

 [address] [id number], [place] [address]  
  Conjunctivitis of right eye, unspecified conjunctivitis type 
H10.9 and Strep throat J02.0

 

 [address] [id number], [place] [address]  
  Sore throat J02.9

 

 [place] [address]00565100[place], [place] [address]-
[address]  17 Dec, 2009   

 

 [place] [id number] [place] [phone]-
[phone]  17 Dec, 2009   

 

 [place] [address]00565100[place], [place] [address]-
[address]  19 May, 2009   







IMMUNIZATIONS

No Known Immunizations



SOCIAL HISTORY

Never Assessed



REASON FOR VISIT

Establish Care, - Pt is fasting.  Karine CALIX



PLAN OF CARE







 Activity  Details









  









 Follow Up  4 Weeks Reason:depression







VITAL SIGNS







 Height  66 in  2017

 

 Weight  246.3 lbs  2017

 

 Temperature  98.6 degrees Fahrenheit  2017

 

 Heart Rate  79 bpm  2017

 

 Respiratory Rate  18   2017

 

 BMI  39.75 kg/m2  2017

 

 Blood pressure systolic  112 mmHg  2017

 

 Blood pressure diastolic  72 mmHg  2017







MEDICATIONS







 Medication  Instructions  Dosage  Frequency  Start Date  End Date  Duration  
Status

 

 Motrin                    Not-Taking

 

 Pseudoephedrine HCl 60 mg  Orally every 6 hrs for sinus congestion  1 tablet 
as needed     04 Mar, 2017     14 days  Not-Taking

 

 ProAir  (90 Base) MCG/ACT  Inhalation every 4 hrs  2 puffs as needed  
4h           Active

 

 Amoxicillin 500 mg  Orally every 12 hrs  2 tablet  12h  14 Dec, 2017  24 Dec, 
2017  10 day(s)  Active

 

 Afrin 12 Hour 0.05 %  Nasally Twice a day  2 drops as needed  12h           Not
-Taking

 

 Pristiq 50 mg  Orally Once a day  1 tablet  24h  14 Dec, 2017        Active







RESULTS

No Results



PROCEDURES







 Procedure  Date Ordered  Result  Body Site

 

 STREP A ASSAY W/OPTIC  Dec 14, 2017      

 

 COMPLETE CBC W/AUTO DIFF WBC  Dec 14, 2017      

 

 COMPREHEN METABOLIC PANEL  Dec 14, 2017      

 

 LIPID PANEL  Dec 14, 2017      

 

 VENIPUNCT, ROUTINE*  Dec 14, 2017      

 

 ASSAY THYROID STIM HORMONE  Dec 14, 2017      







INSTRUCTIONS





MEDICATIONS ADMINISTERED

No Known Medications



MEDICAL (GENERAL) HISTORY







 Type  Description  Date

 

 Medical History  asthma   

 

 Medical History  depression ( prozac, pristiq, zoloft, celexa, wellbutrin)   

 

 Medical History  anxiety   

 

 Surgical History  cholecystectomy   

 

 Hospitalization History  childbirth

## 2018-12-11 NOTE — XMS REPORT
[facility]

 Created on: 2018



Kori Barksdale

External Reference #: [id number]

: 1980

Sex: Female



Demographics







 Address  [address] [place]  [address]

 

 Preferred Language  Unknown

 

 Marital Status  Unknown

 

 Adventism Affiliation  Unknown

 

 Race  Unknown

 

 Ethnic Group  Unknown





Author







 Author  HERBER AZEVEDO

 

 [facility]

 

 Address  [address] [place]  [address]



 

 Phone  [phone]







Care Team Providers







 Care Team Member Name  Role  Phone

 

 HERBER AZEVEDO  Unavailable  [phone]







PROBLEMS







 Type  Condition  ICD9-CM Code  EUS37-TK Code  Onset Dates  Condition Status  
SNOMED Code

 

 Problem  Strep throat     J02.0     Active  78464538

 

 Problem  Bipolar depression     F31.30     Active  63031232

 

 Problem  Social anxiety disorder     F40.10     Active  24277415

 

 Problem  Moderate major depression     F32.1     Active  840783

 

 Problem  Meralgia paresthetica of left side     G57.12     Active  94952682

 

 Problem  Congenital defect of spine     Q76.49     Active  604129269

 

 Problem  Recurrent major depressive disorder, in partial remission     F33.41 
    Active  91727716







ALLERGIES

No Information



ENCOUNTERS







 Encounter  Location  Date  Diagnosis

 

 [facility]  [address] [facility] [id number], [place] [address]  07 Aug, 2018   

 

 [facility]  [id number] [address]    Bipolar depression F31.30 and Social anxiety disorder F40.10

 

 [place] [address] [id number], [place] [address]  
15 Justo, 2018  Congenital defect of spine Q76.49

 

 [facility]  [address] [id number], [place] [address]  
  Moderate major depression F32.1

 

 [facility]  [address] [facility] [address] [id number], [place] [address]  
22 May, 2018  Recurrent major depressive disorder, in partial remission F33.41

 

 [facility]  [address] [id number], [place] [address]  
09 May, 2018  Recurrent major depressive disorder, in partial remission F33.41

 

 [facility]  [address] [id number], [place] [address]  
04 May, 2018  Moderate major depression F32.1 and Thrush B37.0

 

 [facility]  [address] [id number], [place] [address]  
   

 

 [facility]  [address] [id number], [place] [address]  
  Moderate major depression F32.1

 

 [facility]  [address] [id number], [place] [address]  
  Moderate major depression F32.1 and Thrush B37.0

 

 [facility]  [facility]  [address] [id number], [place] [address]  
   

 

 [facility]  [address]00565100[place], [place] [address]    Right acute otitis media H66.91 ; Cough R05 and Tobacco 
abuse Z72.0

 

 [facility] [id number], [place] [address]  
  Moderate major depression F32.1 and Acute hemorrhoid K64.9

 

 [facility]  [address] [place] [address] [address] [id number], [place] [address]  
14 Dec, 2017  Moderate major depression F32.1 ; History of asthma Z87.09 ; 
Tobacco use Z72.0 ; Tobacco abuse counseling Z71.6 ; Meralgia paresthetica of 
left side G57.12 ; Sore throat J02.9 and Strep throat J02.0

 

 [facility] [id number], [place] [address]  
12 Dec, 2017   

 

 [facility]  [address] [place] [phone]    Pharyngitis due to other organism J02.8

 

 [facility]  [address] [place] [address]-
[address]  28 Mar, 2017   

 

 [facility]  [id number] [place] [address]-
[phone]  14 Mar, 2017  Routine gynecological examination Z01.419

 

 [facility]  [address] [facility] [address] [id number], [place] [address]  
04 Mar, 2017  Acute recurrent frontal sinusitis J01.11 and Fever and chills 
R50.9

 

 [facility]  [address]65100[place], [place] [phone]  10 2017  Fever, unspecified fever cause R50.9 and Acute non-
recurrent maxillary sinusitis J01.00

 

 [facility]  [address] [id number], [place] [address]  03 Oct, 2016   

 

 [facility] [id number], [place] [address]  
  Strep throat J02.0

 

 [address]65100[place], [place] [address]  
  Conjunctivitis of right eye, unspecified conjunctivitis type 
H10.9 and Strep throat J02.0

 

 [address] [id number], [place] [address]  
  Sore throat J02.9

 

 [facility]  [address] [place] [phone]-
1721  17 Dec, 2009   

 

 [facility]  [address] [place] [address]-
2390  17 Dec, 2009   

 

 [facility]  [address] [place], [place] [phone]-
[address]  19 May, 2009   







IMMUNIZATIONS

No Known Immunizations



SOCIAL HISTORY

Never Assessed



REASON FOR VISIT





PLAN OF CARE





VITAL SIGNS





MEDICATIONS

Unknown Medications



RESULTS

No Results



PROCEDURES

No Known procedures



INSTRUCTIONS





MEDICATIONS ADMINISTERED

No Known Medications



MEDICAL (GENERAL) HISTORY







 Type  Description  Date

 

 Medical History  asthma   

 

 Medical History  depression ( prozac, pristiq, zoloft, celexa, wellbutrin)   

 

 Medical History  anxiety   

 

 Medical History  Chronic low back pain- missing vertebra   

 

 Medical History  Spina befida - close   

 

 Medical History  Degenerative disc disease   

 

 Surgical History  cholecystectomy   

 

 Hospitalization History  childbirth

## 2018-12-11 NOTE — XMS REPORT
[facility]

 Created on: 2018



Kori Barksdale

External Reference #: [id number]

: 1980

Sex: Female



Demographics







 Address  [address] [place]  [address]

 

 Preferred Language  Unknown

 

 Marital Status  Unknown

 

 Temple Affiliation  Unknown

 

 Race  Unknown

 

 Ethnic Group  Unknown





Author







 Author  HERBER AZEVEDO

 

 [facility]

 

 Address  [address] [place]  [address]



 

 Phone  [phone]







Care Team Providers







 Care Team Member Name  Role  Phone

 

 HERBER AZEVEDO  Unavailable  [phone]







PROBLEMS







 Type  Condition  ICD9-CM Code  EST92-FP Code  Onset Dates  Condition Status  
SNOMED Code

 

 Problem  Congenital defect of spine     Q76.49     Active  963508410

 

 Problem  Recurrent major depressive disorder, in partial remission     F33.41 
    Active  47561957

 

 Problem  Strep throat     J02.0     Active  70583562

 

 Problem  Moderate major depression     F32.1     Active  934663

 

 Problem  Meralgia paresthetica of left side     G57.12     Active  83777435







ALLERGIES







 Substance  Reaction  Event Type  Date  Status

 

 Depo-Provera  fluid retention  Drug Allergy    Active







ENCOUNTERS







 Encounter  Location  Date  Diagnosis

 

 [facility]  [address] [facility] [id number] [place], [place] [address]-
[phone]     

 

 [facility]  [address] [id number] [place], [place] [address]  
15 Justo, 2018  Congenital defect of spine Q76.49

 

 [facility]  [address] [id number] [place], [place] [address]  
  Moderate major depression F32.1

 

 [facility]  [address] [id number] [place], [place] [address]  
22 May, 2018  Recurrent major depressive disorder, in partial remission F33.41

 

 [facility]  [address] [id number] [place], [place] [address]  
09 May, 2018  Recurrent major depressive disorder, in partial remission F33.41

 

 [facility]  [address] [id number] [place], [place] [address]  
04 May, 2018  Moderate major depression F32.1 and Thrush B37.0

 

 [facility]  [address] [id number] [place], [place] [address]  
   

 

 [address] [id number] [place], [place] [address]  
  Moderate major depression F32.1

 

 [address] [id number] [place], [place] [address]  
  Moderate major depression F32.1 and Thrush B37.0

 

 [id number] [place] [id number]  
   

 

 [facility]  [address]00565100[place] [address]    Right acute otitis media H66.91 ; Cough R05 and Tobacco 
abuse Z72.0

 

 [address] [id number] [place], [place] [address]  
  Moderate major depression F32.1 and Acute hemorrhoid K64.9

 

 [address] [id number] [place], [place] [address]  
14 Dec, 2017  Moderate major depression F32.1 ; History of asthma Z87.09 ; 
Tobacco use Z72.0 ; Tobacco abuse counseling Z71.6 ; Meralgia paresthetica of 
left side G57.12 ; Sore throat J02.9 and Strep throat J02.0

 

 [address] [id number] [place], [place] [address]  
12 Dec, 2017   

 

 [facility]  [address]00565100[place], [place] [address]    Pharyngitis due to other organism J02.8

 

 [facility]  [address] [place] [address]-
[address]  28 Mar, 2017   

 

 [facility]  [id number] [place] [address]-
1670  14 Mar, 2017  Routine gynecological examination Z01.419

 

 [address] [id number] [place], [place] [id number]  
04 Mar, 2017  Acute recurrent frontal sinusitis J01.11 and Fever and chills 
R50.9

 

 [facility]  [address]00565100[place], [place] [address]  10 2017  Fever, unspecified fever cause R50.9 and Acute non-
recurrent maxillary sinusitis J01.00

 

 [facility]  [address]00565100[place] [address]-
[address]  03 Oct, 2016   

 

 [facility]  [address] [facility] [address] [id number] [place], [place] [id number]  
  Strep throat J02.0

 

 [id number] [place] [id number]  
  Conjunctivitis of right eye, unspecified conjunctivitis type 
H10.9 and Strep throat J02.0

 

 [address]00565100[place], [place] [address]  
  Sore throat J02.9

 

 [facility]  [address] [place] [phone]
[phone]  17 Dec, 2009   

 

 [facility]  [id number] [place] [phone]-
[phone]  17 Dec, 2009   

 

 [facility]  [id number] [place] [phone]-
[phone]  19 May, 2009   







IMMUNIZATIONS

No Known Immunizations



SOCIAL HISTORY

Never Assessed



REASON FOR VISIT

Depression f/u- [id number]



PLAN OF CARE







 Activity  Details









  









 Follow Up  prn, 1 Year Reason:







VITAL SIGNS







 Height  66 in  2018

 

 Weight  244 lbs  2018

 

 Temperature  98.3 degrees Fahrenheit  2018

 

 Heart Rate  90 bpm  2018

 

 Respiratory Rate  16   2018

 

 BMI  39.38 kg/m2  2018

 

 Blood pressure systolic  128 mmHg  2018

 

 Blood pressure diastolic  78 mmHg  2018







MEDICATIONS







 Medication  Instructions  Dosage  Frequency  Start Date  End Date  Duration  
Status

 

 ProAir  (90 Base) MCG/ACT  Inhalation every 4 hrs  2 puffs as needed  
4h           Active

 

 Afrin 12 Hour 0.05 %  Nasally Twice a day  2 drops as needed  12h           Not
-Taking

 

 Pristiq 50 mg  Orally Once a day  1 tablet  24h  14 Dec, 2017        Active

 

 Anusol-HC 25 MG  Rectal Three times a day  1 suppository  8h      
    Active

 

 Motrin                    Not-Taking

 

 Pseudoephedrine HCl 60 mg  Orally every 6 hrs for sinus congestion  1 tablet 
as needed     04 Mar, 2017     14 days  Not-Taking







RESULTS

No Results



PROCEDURES

No Known procedures



INSTRUCTIONS





MEDICATIONS ADMINISTERED

No Known Medications



MEDICAL (GENERAL) HISTORY







 Type  Description  Date

 

 Medical History  asthma   

 

 Medical History  depression ( prozac, pristiq, zoloft, celexa, wellbutrin)   

 

 Medical History  anxiety   

 

 Medical History  Chronic low back pain- missing vertebra   

 

 Surgical History  cholecystectomy   

 

 Hospitalization History  childbirth

## 2018-12-11 NOTE — XMS REPORT
[facility]

 Created on: 2018



Kori Barksdale

External Reference #: [id number]

: 1980

Sex: Female



Demographics







 Address  [address] [place]  [address]

 

 Preferred Language  Unknown

 

 Marital Status  Unknown

 

 Yazidi Affiliation  Unknown

 

 Race  Unknown

 

 Ethnic Group  Unknown





Author







 Author  BELA MCKEON

 

 [facility]

 

 Address  [address] [place]  [address]



 

 Phone  [phone]







Care Team Providers







 Care Team Member Name  Role  Phone

 

 LINDA  BELA  Unavailable  [phone]







PROBLEMS







 Type  Condition  ICD9-CM Code  HZV13-RS Code  Onset Dates  Condition Status  
SNOMED Code

 

 Problem  Strep throat     J02.0     Active  05501416

 

 Problem  Bipolar depression     F31.30     Active  04237635

 

 Problem  Social anxiety disorder     F40.10     Active  27220043

 

 Problem  Moderate major depression     F32.1     Active  697686

 

 Problem  Meralgia paresthetica of left side     G57.12     Active  39399133

 

 Problem  Congenital defect of spine     Q76.49     Active  999873430

 

 Problem  Recurrent major depressive disorder, in partial remission     F33.41 
    Active  72593581







ALLERGIES

No Information



ENCOUNTERS







 Encounter  Location  Date  Diagnosis

 

 [facility]  [address] [place] [address]-
8263     

 

 [facility]  [id number] [place] [address]-
7929  30 Oct, 2018  Bipolar depression F31.30 and Social anxiety disorder F40.10

 

 [facility]  [address] [place] [address]-
3130  20 Sep, 2018  Bipolar depression F31.30 and Social anxiety disorder F40.10

 

 [facility]  [address] [place] [address]-
9768  29 Aug, 2018  Bipolar depression F31.30

 

 [facility]  [id number] [place] [address]-
2854  07 Aug, 2018  Bipolar depression F31.30 and Social anxiety disorder F40.10

 

 [facility]  [address] [id number], [place] [address]  
04 Aug, 2018  Hordeolum internum of right upper eyelid H00.021

 

 [facility]  [address] [facility] [id number] [place] [address]
6102    Bipolar depression F31.30 and Social anxiety disorder F40.10

 

 [facility]  [address] [id number], [place] [address]  
15 Justo, 2018  Congenital defect of spine Q76.49

 

 [facility]  [address] [id number], [place] [address]  
  Moderate major depression F32.1

 

 [facility]  [address] [id number], [place] [id number]  
22 May, 2018  Recurrent major depressive disorder, in partial remission F33.41

 

 [facility]  [address] [id number], [place] [address]  
09 May, 2018  Recurrent major depressive disorder, in partial remission F33.41

 

 [facility]  [address] [id number], [place] [address]  
04 May, 2018  Moderate major depression F32.1 and Thrush B37.0

 

 [facility]  [address] [id number], [place] [id number]  
   

 

 [facility]  [address] [id number], [place] [address]  
  Moderate major depression F32.1

 

 [facility]  [address] [id number], [place] [address]  
  Moderate major depression F32.1 and Thrush B37.0

 

 [facility]  [address] [id number], [place] [address]  
   

 

 [facility]  [address] [facility] [id number], [place] [address]    Right acute otitis media H66.91 ; Cough R05 and Tobacco 
abuse Z72.0

 

 [facility]  [address] [id number], [place] [address]  
  Moderate major depression F32.1 and Acute hemorrhoid K64.9

 

 [facility]  [address] [id number], [place] [address]  
14 Dec, 2017  Moderate major depression F32.1 ; History of asthma Z87.09 ; 
Tobacco use Z72.0 ; Tobacco abuse counseling Z71.6 ; Meralgia paresthetica of 
left side G57.12 ; Sore throat J02.9 and Strep throat J02.0

 

 [address] [id number], [place] [id number]  
12 Dec, 2017   

 

 [facility] WALK IN [place] [id number] [place] [phone]  11 2017  Pharyngitis due to other organism J02.8

 

 [place] [id number] [place] [address]-
8352  28 Mar, 2017   

 

 [place] [id number] [place] [address]-
0038  14 Mar, 2017  Routine gynecological examination Z01.419

 

 [address] [id number], [place] [address]  
04 Mar, 2017  Acute recurrent frontal sinusitis J01.11 and Fever and chills 
R50.9

 

 [facility] WALK IN [place] [id number] [place] [address]  10 2017  Fever, unspecified fever cause R50.9 and Acute non-
recurrent maxillary sinusitis J01.00

 

 [address] [place] [address]-
6950  03 Oct, 2016   

 

 [facility] [id number], [place] [address]  
  Strep throat J02.0

 

 [facility] [id number] [place] [id number]  
06 2016  Conjunctivitis of right eye, unspecified conjunctivitis type 
H10.9 and Strep throat J02.0

 

 [address] [id number], [place] [address]  
04 2016  Sore throat J02.9

 

 [facility]  [address] [facility] [id number] [place] [address]-
7237  17 Dec, 2009   

 

 [facility]  [address] [facility] [id number], [place] [address]7966-
3337  17 Dec, 2009   

 

 [facility]  [address] [facility] [id number][place] [address]-
5849  19 May, 2009   







IMMUNIZATIONS

No Known Immunizations



SOCIAL HISTORY

Never Assessed



REASON FOR VISIT

Psychiatric f/u.  Marie BELTRÁN, Mood/ sleep/ social anxiety



PLAN OF CARE







 Activity  Details









  









 Follow Up  3 Months Reason:







VITAL SIGNS





MEDICATIONS







 Medication  Instructions  Dosage  Frequency  Start Date  End Date  Duration  
Status

 

 Ramelteon 8 MG  Orally Once a day for sleep  1 tablet at bedtime as needed     
20 Sep, 2018        Active

 

 ProAir  (90 Base) MCG/ACT  Inhalation every 4 hrs  2 puffs as needed  
4h           Active

 

 Lamictal 200 MG  Orally at bedtime  1 tablets             Active

 

 Pristiq 100 MG  Orally Once a day  1 tablet  24h  14 Dec, 2017        Active







RESULTS

No Results



PROCEDURES

No Known procedures



INSTRUCTIONS





MEDICATIONS ADMINISTERED

No Known Medications



MEDICAL (GENERAL) HISTORY







 Type  Description  Date

 

 Medical History  asthma   

 

 Medical History  depression ( prozac, pristiq, zoloft, celexa, wellbutrin)   

 

 Medical History  anxiety   

 

 Medical History  Chronic low back pain- missing vertebra   

 

 Medical History  Spina befida - close   

 

 Medical History  Degenerative disc disease   

 

 Surgical History  cholecystectomy   

 

 Hospitalization History  childbirth

## 2018-12-11 NOTE — XMS REPORT
[facility]

 Created on: 2018



Kori Barksdale

External Reference #: [id number]

: 1980

Sex: Female



Demographics







 Address  [address] [place]  [address]

 

 Preferred Language  Unknown

 

 Marital Status  Unknown

 

 Catholic Affiliation  Unknown

 

 Race  Unknown

 

 Ethnic Group  Unknown





Author







 Author  HERBER AZEVEDO

 

 [facility]

 

 Address  [address] [place]  [address]



 

 Phone  [phone]







Care Team Providers







 Care Team Member Name  Role  Phone

 

 HERBER AZEVEDO  Unavailable  [phone]







PROBLEMS







 Type  Condition  ICD9-CM Code  TUR39-KF Code  Onset Dates  Condition Status  
SNOMED Code

 

 Problem  Strep throat     J02.0     Active  40014135

 

 Problem  Bipolar depression     F31.30     Active  80337653

 

 Problem  Social anxiety disorder     F40.10     Active  17991307

 

 Problem  Moderate major depression     F32.1     Active  763496

 

 Problem  Meralgia paresthetica of left side     G57.12     Active  87883138

 

 Problem  Congenital defect of spine     Q76.49     Active  665516703

 

 Problem  Recurrent major depressive disorder, in partial remission     F33.41 
    Active  22322853







ALLERGIES







 Substance  Reaction  Event Type  Date  Status

 

 Depo-Provera  fluid retention  Drug Allergy  15 Justo, 2018  Active







ENCOUNTERS







 Encounter  Location  Date  Diagnosis

 

 [facility]  [address]65100[place], [place] [address]-
9317  20 Sep, 2018   

 

 [facility]  [address] [place] [address]-
9176  07 Aug, 2018  Bipolar depression F31.30 and Social anxiety disorder F40.10

 

 [address]  [address] [id number], [place] [address]  
04 Aug, 2018  Hordeolum internum of right upper eyelid H00.021

 

 [facility]  [id number], [place] [address]-
2728    Bipolar depression F31.30 and Social anxiety disorder F40.10

 

 [facility]  [address] [id number], [place] [address]  
15 Justo, 2018  Congenital defect of spine Q76.49

 

 [place] [address] [address]  [address] [id number], [place] [id number]  
  Moderate major depression F32.1

 

 [facility]  [address] [id number], [place] [id number]  
22 May, 2018  Recurrent major depressive disorder, in partial remission F33.41

 

 [facility]  [address] [id number], [place] [address]  
09 May, 2018  Recurrent major depressive disorder, in partial remission F33.41

 

 [facility]  [address] [id number], [place] [address]  
04 May, 2018  Moderate major depression F32.1 and Thrush B37.0

 

 [facility]  [address] [id number], [place] [address]  
   

 

 [facility]  [address] [id number], [place] [address]  
  Moderate major depression F32.1

 

 [facility]  [address] [id number], [place] [address]  
  Moderate major depression F32.1 and Thrush B37.0

 

 [facility]  [address] [id number], [place] [id number]  
   

 

 [facility]  [address] [facility] [id number], [place] [address]    Right acute otitis media H66.91 ; Cough R05 and Tobacco 
abuse Z72.0

 

 [facility]  [address]  [address] [id number], [place] [address]  
  Moderate major depression F32.1 and Acute hemorrhoid K64.9

 

 [facility]  [address] [id number], [place] [address]  
14 Dec, 2017  Moderate major depression F32.1 ; History of asthma Z87.09 ; 
Tobacco use Z72.0 ; Tobacco abuse counseling Z71.6 ; Meralgia paresthetica of 
left side G57.12 ; Sore throat J02.9 and Strep throat J02.0

 

 [facility]  [address] [id number], [place] [address]  
12 Dec, 2017   

 

 [facility]  [address]00565100[place], [place] [address]    Pharyngitis due to other organism J02.8

 

 [facility]  [address]00565100[place], [place] [address]-
2794  28 Mar, 2017   

 

 [facility]  [id number] [place] [address]-
4815  14 Mar, 2017  Routine gynecological examination Z01.419

 

 [address] [id number], [place] [address]  
04 Mar, 2017  Acute recurrent frontal sinusitis J01.11 and Fever and chills 
R50.9

 

 [facility] WALK IN [facility]  [address]00565100[place], [place] [address]  10 2017  Fever, unspecified fever cause R50.9 and Acute non-
recurrent maxillary sinusitis J01.00

 

 [place] [address]00565100[place], [place] [address]-
1067  03 Oct, 2016   

 

 [address] [id number], [place] [id number]  
  Strep throat J02.0

 

 [id number] [place] [id number]  
  Conjunctivitis of right eye, unspecified conjunctivitis type 
H10.9 and Strep throat J02.0

 

 [address] [id number], [place] [address]  
  Sore throat J02.9

 

 [place] [address]00565100[place], [place] [address]-
3704  17 Dec, 2009   

 

 [place] [address]00565100[place], [place] [address]-
5505  17 Dec, 2009   

 

 [facility]  [address]00565100[place], [place] [address]-
0871  19 May, 2009   







IMMUNIZATIONS

No Known Immunizations



SOCIAL HISTORY

Never Assessed



REASON FOR VISIT

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 
missing an area, pain rates  12- at the best and 8 at the worst---JEREL caal



PLAN OF CARE







 Activity  Details









  









 Follow Up  prn Reason:







VITAL SIGNS







 Height  67 in  2018-06-15

 

 Weight  243.6 lbs  2018-06-15

 

 Temperature  98.3 degrees Fahrenheit  2018-06-15

 

 Heart Rate  83 bpm  2018-06-15

 

 Respiratory Rate  16   2018-06-15

 

 BMI  38.15 kg/m2  2018-06-15

 

 Blood pressure systolic  132 mmHg  2018-06-15

 

 Blood pressure diastolic  89 mmHg  2018-06-15







MEDICATIONS







 Medication  Instructions  Dosage  Frequency  Start Date  End Date  Duration  
Status

 

 Pristiq 50 mg  Orally Once a day  1 tablet  24h  14 Dec, 2017        Active

 

 ProAir  (90 Base) MCG/ACT  Inhalation every 4 hrs  2 puffs as needed  
4h           Active

 

 Aripiprazole 5 mg  Orally Once a day  1/2 tablet  24h  13 2018        
Active







RESULTS

No Results



PROCEDURES

No Known procedures



INSTRUCTIONS





MEDICATIONS ADMINISTERED

No Known Medications



MEDICAL (GENERAL) HISTORY







 Type  Description  Date

 

 Medical History  asthma   

 

 Medical History  depression ( prozac, pristiq, zoloft, celexa, wellbutrin)   

 

 Medical History  anxiety   

 

 Medical History  Chronic low back pain- missing vertebra   

 

 Medical History  Spina befida - close   

 

 Medical History  Degenerative disc disease   

 

 Surgical History  cholecystectomy   

 

 Hospitalization History  childbirth

## 2018-12-11 NOTE — XMS REPORT
[facility]

 Created on: 10/07/2018



Kori Barksdale

External Reference #: [id number]

: 1980

Sex: Female



Demographics







 Address  [address] [place]  [address]

 

 Preferred Language  Unknown

 

 Marital Status  Unknown

 

 Protestant Affiliation  Unknown

 

 Race  Unknown

 

 Ethnic Group  Unknown





Author







 Author  BELA MCKEON

 

 [facility]

 

 Address  [address] [place]  [address]



 

 Phone  [phone]







Care Team Providers







 Care Team Member Name  Role  Phone

 

 LINDA  BELA  Unavailable  [phone]







PROBLEMS







 Type  Condition  ICD9-CM Code  BMO50-EM Code  Onset Dates  Condition Status  
SNOMED Code

 

 Problem  Strep throat     J02.0     Active  33970704

 

 Problem  Bipolar depression     F31.30     Active  93794282

 

 Problem  Social anxiety disorder     F40.10     Active  98934633

 

 Problem  Moderate major depression     F32.1     Active  051579

 

 Problem  Meralgia paresthetica of left side     G57.12     Active  63080723

 

 Problem  Congenital defect of spine     Q76.49     Active  263705522

 

 Problem  Recurrent major depressive disorder, in partial remission     F33.41 
    Active  46125966







ALLERGIES







 Substance  Reaction  Event Type  Date  Status

 

 Depo-Provera  fluid retention  Drug Allergy  20 Sep, 2018  Active

 

 Ambien  hallucination  Drug Allergy  20 Sep, 2018  Active

 

 Abilify  GI distress  Drug Allergy  20 Sep, 2018  Active







ENCOUNTERS







 Encounter  Location  Date  Diagnosis

 

 [facility]  [address] [id number], [place] [address]-
7265  30 Oct, 2018   

 

 [facility]  [address]00565100[place], [place] [address]-
[address]82  20 Sep, 2018  Bipolar depression F31.30 and Social anxiety disorder F40.10

 

 [facility]  [address] [id number], [place] [address]-
7323  29 Aug, 2018  Bipolar depression F31.30

 

 [facility]  [address] [place] [address]-
2456  07 Aug, 2018  Bipolar depression F31.30 and Social anxiety disorder F40.10

 

 [place] [address] [address]  [address] [id number] [place], [place] [id number]  
04 Aug, 2018  Hordeolum internum of right upper eyelid H00.021

 

 [facility]  [address] [facility] [id number] [place] [address]-
3807    Bipolar depression F31.30 and Social anxiety disorder F40.10

 

 [facility]  [address] [id number] [place], [place] [address]  
15 Justo, 2018  Congenital defect of spine Q76.49

 

 [facility]  [address] [id number] [place], [place] [address]  
  Moderate major depression F32.1

 

 [facility]  [address] [id number] [place], [place] [address]  
22 May, 2018  Recurrent major depressive disorder, in partial remission F33.41

 

 [facility]  [address] [id number] [place], [place] [address]  
09 May, 2018  Recurrent major depressive disorder, in partial remission F33.41

 

 [facility]  [address] [id number] [place], [place] [address]  
04 May, 2018  Moderate major depression F32.1 and Thrush B37.0

 

 [facility]  [address]  [address] [id number] [place]andro, [place] [id number]  
   

 

 [facility]  [address]  [address] [id number] [place], [place] [address]  
  Moderate major depression F32.1

 

 [facility]  [address]  [address] [id number] [place]andro, [place] [address]  
  Moderate major depression F32.1 and Thrush B37.0

 

 [facility]  [address] [id number] [place], [place] [address]  
   

 

 [facility]  [address] [facility] [id number], [place] [address]    Right acute otitis media H66.91 ; Cough R05 and Tobacco 
abuse Z72.0

 

 [facility]  [address] [id number] [place], [place] [address]  
  Moderate major depression F32.1 and Acute hemorrhoid K64.9

 

 [facility]  [address]  [address] [id number] [place]andro, [place] [address]  
14 Dec, 2017  Moderate major depression F32.1 ; History of asthma Z87.09 ; 
Tobacco use Z72.0 ; Tobacco abuse counseling Z71.6 ; Meralgia paresthetica of 
left side G57.12 ; Sore throat J02.9 and Strep throat J02.0

 

 [address] [id number] [place], [place] [address]  
12 Dec, 2017   

 

 [facility] WALK IN [facility]  [address] [place] [address]  11 2017  Pharyngitis due to other organism J02.8

 

 [place] [address] [place] [id number] [place] [address]-
5221  28 Mar, 2017   

 

 [place] [id number] [place] [address]-
3924  14 Mar, 2017  Routine gynecological examination Z01.419

 

 [address] [id number] [place]andro, [place] [address]  
04 Mar, 2017  Acute recurrent frontal sinusitis J01.11 and Fever and chills 
R50.9

 

 [facility] WALK IN [facility]  [address] [place] [address]  10 2017  Fever, unspecified fever cause R50.9 and Acute non-
recurrent maxillary sinusitis J01.00

 

 [place] [address] [place] [address]-
5613  03 Oct, 2016   

 

 [address] [id number] [place], [place] [address]  
  Strep throat J02.0

 

 [address] [id number] [place] [id number]  
06 2016  Conjunctivitis of right eye, unspecified conjunctivitis type 
H10.9 and Strep throat J02.0

 

 [address] [id number] [place], [place] [address]  
  Sore throat J02.9

 

 [facility]  [address] [facility] [id number], [place] [address]-
3924  17 Dec, 2009   

 

 [facility]  [address] [facility] [id number], [place] [address]-
0191  17 Dec, 2009   

 

 [facility]  [address] [facility] [id number], [place] [address]-
8646  19 May, 2009   







IMMUNIZATIONS

No Known Immunizations



SOCIAL HISTORY

Never Assessed



REASON FOR VISIT

 f/u Rossi, mood / anxiety



PLAN OF CARE







 Activity  Details









  









 Follow Up  6 Weeks Reason:







VITAL SIGNS







 Height  67 in  2018

 

 Weight  252.2 lbs  2018

 

 Heart Rate  100 bpm  2018

 

 Respiratory Rate  22   2018

 

 BMI  39.50 kg/m2  2018

 

 Blood pressure systolic  144 mmHg  2018

 

 Blood pressure diastolic  78 mmHg  2018







MEDICATIONS







 Medication  Instructions  Dosage  Frequency  Start Date  End Date  Duration  
Status

 

 Pristiq 50 mg  Orally Once a day  1 tablet  24h  14 Dec, 2017        Active

 

 Ramelteon 8 MG  Orally Once a day for sleep  1 tablet at bedtime as needed     
20 Sep, 2018        Active

 

 Lamictal 200 MG  Orally at bedtime  1 tablets             Active

 

 ProAir  (90 Base) MCG/ACT  Inhalation every 4 hrs  2 puffs as needed  
4h           Active







RESULTS

No Results



PROCEDURES

No Known procedures



INSTRUCTIONS





MEDICATIONS ADMINISTERED

No Known Medications



MEDICAL (GENERAL) HISTORY







 Type  Description  Date

 

 Medical History  asthma   

 

 Medical History  depression ( prozac, pristiq, zoloft, celexa, wellbutrin)   

 

 Medical History  anxiety   

 

 Medical History  Chronic low back pain- missing vertebra   

 

 Medical History  Spina befida - close   

 

 Medical History  Degenerative disc disease   

 

 Surgical History  cholecystectomy   

 

 Hospitalization History  childbirth

## 2018-12-11 NOTE — XMS REPORT
[facility]

 Created on: 2018



Kori Barksdale

External Reference #: [id number]

: 1980

Sex: Female



Demographics







 Address  [address] [place]  [address]

 

 Preferred Language  Unknown

 

 Marital Status  Unknown

 

 Baptism Affiliation  Unknown

 

 Race  Unknown

 

 Ethnic Group  Unknown





Author







 Author  LOUIS PATEL

 

 [facility]

 

 Address  [address] [place]  [address]



 

 Phone  [phone]







Care Team Providers







 Care Team Member Name  Role  Phone

 

 LOUIS PATEL  Unavailable  [phone]







PROBLEMS







 Type  Condition  ICD9-CM Code  YYT00-JU Code  Onset Dates  Condition Status  
SNOMED Code

 

 Problem  Strep throat     J02.0     Active  08090951

 

 Problem  Bipolar depression     F31.30     Active  31101002

 

 Problem  Social anxiety disorder     F40.10     Active  34132256

 

 Problem  Moderate major depression     F32.1     Active  155558

 

 Problem  Meralgia paresthetica of left side     G57.12     Active  25010952

 

 Problem  Congenital defect of spine     Q76.49     Active  918195882

 

 Problem  Recurrent major depressive disorder, in partial remission     F33.41 
    Active  05254255







ALLERGIES







 Substance  Reaction  Event Type  Date  Status

 

 Depo-Provera  fluid retention  Drug Allergy    Active







ENCOUNTERS







 Encounter  Location  Date  Diagnosis

 

 [facility]  [id number] [address]  07 Aug, 2018   

 

 [facility]  [id number] [address]    Bipolar depression F31.30 and Social anxiety disorder F40.10

 

 [facility]  [address] [id number], [place] [id number]  
15 Justo, 2018  Congenital defect of spine Q76.49

 

 [facility]  [address] [id number], [place] [id number]  
  Moderate major depression F32.1

 

 [facility]  [address] [id number], [place] [id number]  
22 May, 2018  Recurrent major depressive disorder, in partial remission F33.41

 

 [facility]  [address] [id number], [place] [address]  
09 May, 2018  Recurrent major depressive disorder, in partial remission F33.41

 

 [facility]  [address] [id number], [place] [id number]  
04 May, 2018  Moderate major depression F32.1 and Thrush B37.0

 

 [facility]  [address] [id number], [place] [id number]  
   

 

 [facility]  [address] [id number], [place] [id number]  
  Moderate major depression F32.1

 

 [facility]  [address] [id number], [place] [id number]  
  Moderate major depression F32.1 and Thrush B37.0

 

 [facility]  [address] [id number], [place] [id number]  
   

 

 [facility]  [id number] [address]    Right acute otitis media H66.91 ; Cough R05 and Tobacco 
abuse Z72.0

 

 [facility]  [address] [facility] [address] [id number], [place] [id number]  
  Moderate major depression F32.1 and Acute hemorrhoid K64.9

 

 [facility]  [address] [place] [address] AVE [id number], [place] [id number]  
14 Dec, 2017  Moderate major depression F32.1 ; History of asthma Z87.09 ; 
Tobacco use Z72.0 ; Tobacco abuse counseling Z71.6 ; Meralgia paresthetica of 
left side G57.12 ; Sore throat J02.9 and Strep throat J02.0

 

 [facility]  [address] [facility] [address] [id number], [place] [id number]  
12 Dec, 2017   

 

 [facility]  [id number] [place] [phone]    Pharyngitis due to other organism J02.8

 

 [facility]  [address]100[place] [address]  28 Mar, 2017   

 

 [facility]  [address]00565100[place] [address]-
[address]  14 Mar, 2017  Routine gynecological examination Z01.419

 

 [facility]  [address] [place] [address] AVE [id number] [place] [id number]  
04 Mar, 2017  Acute recurrent frontal sinusitis J01.11 and Fever and chills 
R50.9

 

 [facility]  [address] [place] [phone]  10 2017  Fever, unspecified fever cause R50.9 and Acute non-
recurrent maxillary sinusitis J01.00

 

 [facility]  [id number] [place] [address]-
2853  03 Oct, 2016   

 

 [facility] [id number] [place] [id number]  
  Strep throat J02.0

 

 [address] [id number] [place] [id number]  
  Conjunctivitis of right eye, unspecified conjunctivitis type 
H10.9 and Strep throat J02.0

 

 [facility] [id number], [place] [id number]  
  Sore throat J02.9

 

 [facility]  [address] [place] [address]-
33[address]  17 Dec, 2009   

 

 [id number] [place] [address]-
2225  17 Dec, 2009   

 

 [id number] [place] [address]-
73[address]  19 May, 2009   







IMMUNIZATIONS

No Known Immunizations



SOCIAL HISTORY

Never Assessed



REASON FOR VISIT

cough/congestion  Pt c/o cough and congestion along with R ear pain for about a 
week and a half  JUSTYN Vogel



PLAN OF CARE







 Activity  Details









  









 Follow Up  prn Reason:







VITAL SIGNS







 Height  66 in  2018

 

 Weight  240.6 lbs  2018

 

 Temperature  98.4 degrees Fahrenheit  2018

 

 Heart Rate  90 bpm  2018

 

 Respiratory Rate  20   2018

 

 BMI  38.83 kg/m2  2018

 

 Blood pressure systolic  122 mmHg  2018

 

 Blood pressure diastolic  64 mmHg  2018







MEDICATIONS







 Medication  Instructions  Dosage  Frequency  Start Date  End Date  Duration  
Status

 

 Pseudoephedrine HCl 60 mg  Orally every 6 hrs for sinus congestion  1 tablet 
as needed     04 Mar, 2017     14 days  Not-Taking

 

 Afrin 12 Hour 0.05 %  Nasally Twice a day  2 drops as needed  12h           Not
-Taking

 

 PredniSONE 10 MG  Orally Once a day  Take 4 tabs po x3 days, 3 tabs po x3 days
, 2 tabs po x3 days, 1 tab po x3 days.  24h        12 days  Active

 

 Augmentin 875-125 MG  Orally every 12 hrs  1 tablet  12h  27 Feb, 2018  9 Mar, 
2018  10 day(s)  Active

 

 Motrin                    Not-Taking

 

 Pristiq 50 mg  Orally Once a day  1 tablet  24h  14 Dec, 2017        Active

 

 Anusol-HC 25 MG  Rectal Three times a day  1 suppository  8h      
    Active

 

 ProAir  (90 Base) MCG/ACT  Inhalation every 4 hrs  2 puffs as needed  
4h           Active







RESULTS

No Results



PROCEDURES

No Known procedures



INSTRUCTIONS





MEDICATIONS ADMINISTERED

No Known Medications



MEDICAL (GENERAL) HISTORY







 Type  Description  Date

 

 Medical History  asthma   

 

 Medical History  depression ( prozac, pristiq, zoloft, celexa, wellbutrin)   

 

 Medical History  anxiety   

 

 Medical History  Chronic low back pain- missing vertebra   

 

 Medical History  Spina befida - close   

 

 Medical History  Degenerative disc disease   

 

 Surgical History  cholecystectomy   

 

 Hospitalization History  childbirth

## 2018-12-11 NOTE — XMS REPORT
Continuity of Care Document

 Created on: 2013



CLEVELAND ACOSTA

External Reference #: [id number]

: 1980

Sex: Female



Demographics







 Address  [address] [place]  [address]

 

 Home Phone  [phone]

 

 Preferred Language  Unknown

 

 Marital Status  Unknown

 

 Hindu Affiliation  Unknown

 

 Race  Unknown

 

 Ethnic Group  Unknown





Author







 Author  [facility]

 

 Organization  [facility]

 

 Address  Unknown

 

 Phone  Unavailable







Support







 Name  Relationship  Address  Phone

 

 KARLA JORGE ALBERTO  Next Of Kin  [address] [place]  [address] [phone]







Care Team Providers







 Care Team Member Name  Role  Phone

 

 OTF AVERY MD  PP  [phone]



                                            



Insurance Providers

                      





 Payer Name                    Policy Number                    Subscriber Name
                    Relationship                

 

 [employer] [facility]                    [id number]                    
Jorge Alberto Acosta                    02                 

 

 Justin [employer]                    [id number]                    [place] Acosta                    01 Self / Same As Patient                



                                                                    



Advance Directives

                      





 Directive                    Response                    Recorded Date        
        

 

 Advance Directives                    N                    13 12:08pm   
             



                                                                               
         



Problems

          No Known Problems or Medical conditions.                             
                                       



Family History

                      





 History                    Response                    Recorded Date/Time     
           

 

 Hx Family Cancer                    Y maternal gpa,gma and paternal gma       
             07/25/10 8:04am                

 

 Hx Family Lung Cancer                    Y                    07/25/10 8:04am 
               



                                                                    



Social History

                      





 History                    Response                    Recorded Date/Time     
           

 

 Alcohol Use                    Denies Use                    13 12:08pm 
               

 

 Recreational Drug Use                    N                    13 12:08pm
                



                                                                               
         



Allergies, Adverse Reactions, Alerts

                      





 Allergen                    Type                    Severity                   
 Reaction                    Last Updated                

 

 DEPO PROVERA                    Allergy                    Unknown            
                             11                

 

 EES                    Allergy                    Unknown                     
                    11                



                                                                               
                             



Medications

                      





 Medication                    Dose                    Units                    
Route                    Sig                    Qty                    Days    
            

 

 Promethazine HCl (Phenergan 25 Mg)                    1                    Tab
                    PO                    QID PRN                    14        
                             

 

 Progesterone,Micronized (Progesterone)                    50                  
  Mg                    VA                    DAILY                            
                              

 

 Progesterone                    50                    Mg                    IM
                                                                               

 

 Albuterol (Ventolin)                    1                    Inh              
      PO                    Q4                                                 
         

 

 Naproxen (Naprosyn)                    1                    Each              
      PO                    BID PRN                    20                      
               

 

 Hydrocodone Bit/Acetaminophen (Hydrocodon-Acetaminophen 5-325)                
    1 - 2                    Each                    PO                    Q6H 
PRN                    20                                     

 

 Cyclobenzaprine HCl (Cyclobenzaprine Hcl)                    1                
    Each                    PO                    Q8HR PRN                    
20                                     



                                                                               
                                                           



Pregnancy

                      





 Response                    Recorded Date/Time                

 

 Status not known                    Unknown                



                                                                              



Results

          No Known Relevant Diagnostic Tests, Laboratory Data and/or Discharge 
Summary.                                                                    



Procedures

                      





 Procedure                    Code                    Date                

 

 EPISIOTOMY                    73.6                    07/25/10                



                                                                              



Encounters

                      





 Encounter                    Location                    Date/Time            
    

 

 Departed Emergency Room                    [facility]                     11:59am                

 

 Discharged Inpatient                    [facility]                    07/25/
10 8:30am

## 2018-12-11 NOTE — XMS REPORT
[facility]

 Created on: 2016



Kori Barksdale

External Reference #: [id number]

: 1980

Sex: Female



Demographics







 Address  [address] [place]  [address]

 

 Home Phone  [phone]

 

 Preferred Language  Unknown

 

 Marital Status  Unknown

 

 Amish Affiliation  Unknown

 

 Race  White

 

 Ethnic Group  Not  or 





Author







 Author  KYLE STUART

 

 Organization  [employer]

 

 Address  Unknown

 

 Phone  Unavailable







Care Team Providers







 Care Team Member Name  Role  Phone

 

 KYLE STUART    Unavailable



                                                                



Allergies

          No Known Allergies                                                   
                                     



Problems

          





 Problem Type  Condition  Code  Onset Dates  Condition Status

 

 Problem  Strep throat  J02.0     Active



                                                                               
         



Medications

          





 Medication  Code System  Code  Instructions  Start Date  End Date  Status  
Dosage

 

 Zithromax  NDC  [address]-43  250 MG Orally Once a day  Oct 03, 2016  Oct 08, 
2016     2 tablets  on the first day, then 1 tablet daily for 4 days



                                                                              



Results

          No Known Results                                                     
               



Summary Purpose

          [employer] Submission

## 2018-12-11 NOTE — XMS REPORT
[facility]

 Created on: 2018



Kori Barksdale

External Reference #: [id number]

: 1980

Sex: Female



Demographics







 Address  [address] [place]  [address]

 

 Preferred Language  Unknown

 

 Marital Status  Unknown

 

 Alevism Affiliation  Unknown

 

 Race  Unknown

 

 Ethnic Group  Unknown





Author







 Author  BELA MCKEON

 

 [facility]

 

 Address  [address] [place]  [address]



 

 Phone  [phone]







Care Team Providers







 Care Team Member Name  Role  Phone

 

 LINDA  BELA  Unavailable  [phone]







PROBLEMS







 Type  Condition  ICD9-CM Code  SBJ92-FZ Code  Onset Dates  Condition Status  
SNOMED Code

 

 Problem  Strep throat     J02.0     Active  59891706

 

 Problem  Bipolar depression     F31.30     Active  60095211

 

 Problem  Social anxiety disorder     F40.10     Active  97904714

 

 Problem  Moderate major depression     F32.1     Active  499184

 

 Problem  Meralgia paresthetica of left side     G57.12     Active  37724301

 

 Problem  Congenital defect of spine     Q76.49     Active  325913559

 

 Problem  Recurrent major depressive disorder, in partial remission     F33.41 
    Active  18520210







ALLERGIES

No Information



ENCOUNTERS







 Encounter  Location  Date  Diagnosis

 

 [facility]  [address]00565100[place] [address]     

 

 [facility]  [id number] [place] [address]     

 

 [address]  [address] [id number], [place] [id number]  
  BMI 40.0-44.9, adult Z68.41 and Acute nasopharyngitis J00

 

 [facility]  [address]00565100[place] [address]-
[address]     

 

 [facility]  [address] [place] [address]-
26[address]  30 Oct, 2018  Bipolar depression F31.30 and Social anxiety disorder F40.10

 

 [facility]  [address]100[place] [address]  20 Sep, 2018  Bipolar depression F31.30 and Social anxiety disorder F40.10

 

 [facility]  [address] [id number] [address]  29 Aug, 2018  Bipolar depression F31.30

 

 [facility]  [address] [facility] [id number] [address]  07 Aug, 2018  Bipolar depression F31.30 and Social anxiety disorder F40.10

 

 [facility]  [address] [id number], [place] [id number]  
04 Aug, 2018  Hordeolum internum of right upper eyelid H00.021

 

 [facility]  [address] [facility] [id number] [place] [address]    Bipolar depression F31.30 and Social anxiety disorder F40.10

 

 [facility]  [address] [id number], [place] [address]  
15 Justo, 2018  Congenital defect of spine Q76.49

 

 [facility]  [address] [id number], [place] [address]  
  Moderate major depression F32.1

 

 [facility]  [address] [id number], [place] [id number]  
22 May, 2018  Recurrent major depressive disorder, in partial remission F33.41

 

 [facility]  [address] [id number], [place] [id number]  
09 May, 2018  Recurrent major depressive disorder, in partial remission F33.41

 

 [facility]  [address] [id number], [place] [id number]  
04 May, 2018  Moderate major depression F32.1 and Thrush B37.0

 

 [facility]  [address] [id number], [place] [id number]  
   

 

 [facility]  [address] [id number], [place] [id number]  
  Moderate major depression F32.1

 

 [facility]  [address] [id number], [place] [address]  
  Moderate major depression F32.1 and Thrush B37.0

 

 [facility]  [address] [id number], [place] [id number]  
   

 

 [facility]  [address]00565100[place], [place] [address]    Right acute otitis media H66.91 ; Cough R05 and Tobacco 
abuse Z72.0

 

 [address] [id number], [place] [id number]  
  Moderate major depression F32.1 and Acute hemorrhoid K64.9

 

 [id number] [place] [id number]  
14 Dec, 2017  Moderate major depression F32.1 ; History of asthma Z87.09 ; 
Tobacco use Z72.0 ; Tobacco abuse counseling Z71.6 ; Meralgia paresthetica of 
left side G57.12 ; Sore throat J02.9 and Strep throat J02.0

 

 [address] [id number], [place] [id number]  
12 Dec, 2017   

 

 [facility]  [id number] [place] [address]    Pharyngitis due to other organism J02.8

 

 [id number] [place] [address]-
[address]  28 Mar, 2017   

 

 [id number] [place] [address]-
[address]  14 Mar, 2017  Routine gynecological examination Z01.419

 

 [id number] [place] [id number]  
04 Mar, 2017  Acute recurrent frontal sinusitis J01.11 and Fever and chills 
R50.9

 

 [facility]  [id number] [place] [address]  10 2017  Fever, unspecified fever cause R50.9 and Acute non-
recurrent maxillary sinusitis J01.00

 

 [address] [place] [address]-
8[address]  03 Oct, 2016   

 

 [address] [id number] [place] [id number]  
  Strep throat J02.0

 

 [address] [facility] [address] [id number], [place] [id number]  
  Conjunctivitis of right eye, unspecified conjunctivitis type 
H10.9 and Strep throat J02.0

 

 [address] [facility] [address] [id number] [place] [id number]  
  Sore throat J02.9

 

 [facility]  [address] [facility] [id number] [place] [address]-
[address]  17 Dec, 2009   

 

 [id number] [address]-
2546  17 Dec, 2009   

 

 [address] [facility] [id number] [place] [address]-
2546  19 May, 2009   







IMMUNIZATIONS

No Known Immunizations



SOCIAL HISTORY

Never Assessed



REASON FOR VISIT

Medication question



PLAN OF CARE





VITAL SIGNS





MEDICATIONS







 Medication  Instructions  Dosage  Frequency  Start Date  End Date  Duration  
Status

 

 Latuda 20 mg  Orally for depression. Take in the evening with a meal (350 
calories)  1 tablet          30 day(s)  Active







RESULTS

No Results



PROCEDURES

No Known procedures



INSTRUCTIONS





MEDICATIONS ADMINISTERED

No Known Medications



MEDICAL (GENERAL) HISTORY







 Type  Description  Date

 

 Medical History  asthma   

 

 Medical History  depression ( prozac, pristiq, zoloft, celexa, wellbutrin)   

 

 Medical History  anxiety   

 

 Medical History  Chronic low back pain- missing vertebra   

 

 Medical History  Spina befida - close   

 

 Medical History  Degenerative disc disease   

 

 Surgical History  cholecystectomy   

 

 Hospitalization History  childbirth

## 2018-12-11 NOTE — XMS REPORT
[facility]

 Created on: 2018



Kori Barksdale

External Reference #: [id number]

: 1980

Sex: Female



Demographics







 Address  [address] [place]  [address]

 

 Preferred Language  Unknown

 

 Marital Status  Unknown

 

 Jain Affiliation  Unknown

 

 Race  Unknown

 

 Ethnic Group  Unknown





Author







 Author  HERBER AZEVEDO

 

 [facility]

 

 Address  [address] [place]  [address]



 

 Phone  [phone]







Care Team Providers







 Care Team Member Name  Role  Phone

 

 HERBER AZEVEDO  Unavailable  [phone]







PROBLEMS







 Type  Condition  ICD9-CM Code  UQG45-DR Code  Onset Dates  Condition Status  
SNOMED Code

 

 Problem  Recurrent major depressive disorder, in partial remission     F33.41 
    Active  34576127

 

 Problem  Moderate major depression     F32.1     Active  567968

 

 Problem  Meralgia paresthetica of left side     G57.12     Active  81697060

 

 Problem  Strep throat     J02.0     Active  30500170







ALLERGIES







 Substance  Reaction  Event Type  Date  Status

 

 Depo-Provera  fluid retention  Drug Allergy  12 Dec, 2017  Active







ENCOUNTERS







 Encounter  Location  Date  Diagnosis

 

 [facility]  AVE [id number][place] [id number]  
15 Justo, 2018   

 

 [facility] [place] [address]  [place] [id number], [place] [address]  
22 May, 2018  Recurrent major depressive disorder, in partial remission F33.41

 

 [address]  [address] [id number], [place] [address]  
09 May, 2018  Recurrent major depressive disorder, in partial remission F33.41

 

 [facility] [place] [address]  [address] [id number], [place] [address]  
04 May, 2018  Moderate major depression F32.1 and Thrush B37.0

 

 [facility]  [address]  [address] [id number], [place] [address]  
   

 

 [facility] [address]  [address] [id number], [place] [address]  
  Moderate major depression F32.1

 

 [facility] [place] [address]  AVE [id number], [place] [address]  
  Moderate major depression F32.1 and Thrush B37.0

 

 [address] [id number], [place] [address]  
   

 

 [facility]  [address] [address]  27 2018  Right acute otitis media H66.91 ; Cough R05 and Tobacco 
abuse Z72.0

 

 [address] [id number], [place] [address]  
  Moderate major depression F32.1 and Acute hemorrhoid K64.9

 

 [id number] [place] [id number]  
14 Dec, 2017  Moderate major depression F32.1 ; History of asthma Z87.09 ; 
Tobacco use Z72.0 ; Tobacco abuse counseling Z71.6 ; Meralgia paresthetica of 
left side G57.12 ; Sore throat J02.9 and Strep throat J02.0

 

 [address] [id number], [place] [address]  
12 Dec, 2017   

 

 [facility]  [id number] [place] [phone]    Pharyngitis due to other organism J02.8

 

 [id number] [place] [address]-
1584  28 Mar, 2017   

 

 [facility]  [id number] [place] [address]-
4726  14 Mar, 2017  Routine gynecological examination Z01.419

 

 [address] [id number], [place] [address]  
04 Mar, 2017  Acute recurrent frontal sinusitis J01.11 and Fever and chills 
R50.9

 

 [facility]  [id number] [place] [phone]  10 2017  Fever, unspecified fever cause R50.9 and Acute non-
recurrent maxillary sinusitis J01.00

 

 [address] [id number] [place] [phone]-
[phone]  03 Oct, 2016   

 

 [facility]  [address] [facility] [address] [id number], [place] [id number]  
  Strep throat J02.0

 

 [address] [facility] AV [id number], [place] [id number]  
  Conjunctivitis of right eye, unspecified conjunctivitis type 
H10.9 and Strep throat J02.0

 

 [address] [id number], [place] [id number]  
  Sore throat J02.9

 

 [address] [facility] [id number], [place] [address]-
[address]  17 Dec, 2009   

 

 [address] [id number], [place] [address]-
2546  17 Dec, 2009   

 

 [address] [facility] [id number], [place] [address]
2546  19 May, 2009   







IMMUNIZATIONS

No Known Immunizations



SOCIAL HISTORY

Never Assessed



REASON FOR VISIT

[facility] Updated-ADaviedRN



PLAN OF CARE





VITAL SIGNS





MEDICATIONS







 Medication  Instructions  Dosage  Frequency  Start Date  End Date  Duration  
Status

 

 Afrin 12 Hour 0.05 %  Nasally Twice a day  2 drops as needed  12h           Not
-Taking

 

 Motrin                    Not-Taking

 

 ProAir  (90 Base) MCG/ACT  Inhalation every 4 hrs  2 puffs as needed  
4h           Active

 

 Pseudoephedrine HCl 60 mg  Orally every 6 hrs for sinus congestion  1 tablet 
as needed     04 Mar, 2017     14 days  Not-Taking







RESULTS

No Results



PROCEDURES

No Known procedures



INSTRUCTIONS





MEDICATIONS ADMINISTERED

No Known Medications



MEDICAL (GENERAL) HISTORY







 Type  Description  Date

 

 Medical History  asthma   

 

 Medical History  depression ( prozac, pristiq, zoloft, celexa, wellbutrin)   

 

 Medical History  anxiety   

 

 Surgical History  cholecystectomy   

 

 Hospitalization History  childbirth

## 2018-12-11 NOTE — XMS REPORT
[facility]

 Created on: 2018



Kori Barksdale

External Reference #: [id number]

: 1980

Sex: Female



Demographics







 Address  [address] [place]  [address]

 

 Preferred Language  Unknown

 

 Marital Status  Unknown

 

 Adventism Affiliation  Unknown

 

 Race  Unknown

 

 Ethnic Group  Unknown





Author







 Author  BELA MCKEON

 

 [facility]

 

 Address  [address] [place]  [address]



 

 Phone  [phone]







Care Team Providers







 Care Team Member Name  Role  Phone

 

 BELA MCKEON  Unavailable  [phone]







PROBLEMS







 Type  Condition  ICD9-CM Code  IVM49-CB Code  Onset Dates  Condition Status  
SNOMED Code

 

 Problem  Strep throat     J02.0     Active  93071330

 

 Problem  Bipolar depression     F31.30     Active  04875922

 

 Problem  Social anxiety disorder     F40.10     Active  25462750

 

 Problem  Moderate major depression     F32.1     Active  185701

 

 Problem  Meralgia paresthetica of left side     G57.12     Active  40969202

 

 Problem  Congenital defect of spine     Q76.49     Active  339210514

 

 Problem  Recurrent major depressive disorder, in partial remission     F33.41 
    Active  88487618







ALLERGIES

No Information



ENCOUNTERS







 Encounter  Location  Date  Diagnosis

 

 [facility]  [address] [place] [phone]
1807  20 Sep, 2018   

 

 [facility]  [id number] [place] [phone]-
[address]  29 Aug, 2018  Bipolar depression F31.30

 

 [facility]  [id number] [place] [address]-
2860  07 Aug, 2018  Bipolar depression F31.30 and Social anxiety disorder F40.10

 

 [address]  [address] [id number], [place] [address]  
04 Aug, 2018  Hordeolum internum of right upper eyelid H00.021

 

 [facility]  [id number] [place] [address]-
2970    Bipolar depression F31.30 and Social anxiety disorder F40.10

 

 [facility]  [address]  [address] [id number], [place] [address]  
15 Justo, 2018  Congenital defect of spine Q76.49

 

 [facility]  [address]  [address] [id number], [place] [id number]  
  Moderate major depression F32.1

 

 [facility]  [address]  [address] [id number], [place] [id number]  
22 May, 2018  Recurrent major depressive disorder, in partial remission F33.41

 

 [facility]  [address]  [address] [id number], [place] [address]  
09 May, 2018  Recurrent major depressive disorder, in partial remission F33.41

 

 [facility]  [address]  [address] [id number], [place] [address]  
04 May, 2018  Moderate major depression F32.1 and Thrush B37.0

 

 [facility]  [address]  [address] [id number], [place] [id number]  
   

 

 [facility]  [address]  [address] [id number], [place] [address]  
  Moderate major depression F32.1

 

 [facility]  [address]  [address] [id number], [place] [id number]  
  Moderate major depression F32.1 and Thrush B37.0

 

 [facility]  [address]  [address] [id number], [place] [address]  
   

 

 [facility]  [address] [facility] [id number], [place] [address]    Right acute otitis media H66.91 ; Cough R05 and Tobacco 
abuse Z72.0

 

 [facility]  [address]  [address] [id number], [place] [address]  
  Moderate major depression F32.1 and Acute hemorrhoid K64.9

 

 [facility]  [address]  [address] [id number], [place] [address]  
14 Dec, 2017  Moderate major depression F32.1 ; History of asthma Z87.09 ; 
Tobacco use Z72.0 ; Tobacco abuse counseling Z71.6 ; Meralgia paresthetica of 
left side G57.12 ; Sore throat J02.9 and Strep throat J02.0

 

 [facility]  [address]  [address] [id number], [place] [address]  
12 Dec, 2017   

 

 [facility]  [address]65100[place] [phone]    Pharyngitis due to other organism J02.8

 

 [facility]  [address]00565100[place], [place] [address]-
[address]  28 Mar, 2017   

 

 [facility]  [id number] [place] [address]-
[address]  14 Mar, 2017  Routine gynecological examination Z01.419

 

 [address] [id number], [place] [id number]  
04 Mar, 2017  Acute recurrent frontal sinusitis J01.11 and Fever and chills 
R50.9

 

 [facility]  [address]65100[place], [place] [address]  10 2017  Fever, unspecified fever cause R50.9 and Acute non-
recurrent maxillary sinusitis J01.00

 

 [facility]  [address]00565100[place], [place] [address]-
[address]  03 Oct, 2016   

 

 [address] [id number], [place] [id number]  
  Strep throat J02.0

 

 [address]65100[place], [place] [address]  
  Conjunctivitis of right eye, unspecified conjunctivitis type 
H10.9 and Strep throat J02.0

 

 [address] [id number], [place] [address]  
  Sore throat J02.9

 

 [facility]  [address] [place] [phone]
9549  17 Dec, 2009   

 

 [facility]  [address]00565100[place], [place] [address]-
8563  17 Dec, 2009   

 

 [facility]  [id number] [place] [address]-
[address]  19 May, 2009   







IMMUNIZATIONS

No Known Immunizations



SOCIAL HISTORY

Never Assessed



REASON FOR VISIT

 f/cecelia Salazar RN, depression



PLAN OF CARE







 Activity  Details









  









 Follow Up  6 Weeks Reason:







VITAL SIGNS







 Height  67 in  2018

 

 Weight  246 lbs  2018

 

 Heart Rate  90 bpm  2018

 

 Respiratory Rate  20   2018

 

 BMI  38.52 kg/m2  2018

 

 Blood pressure systolic  132 mmHg  2018

 

 Blood pressure diastolic  86 mmHg  2018







MEDICATIONS







 Medication  Instructions  Dosage  Frequency  Start Date  End Date  Duration  
Status

 

 Lamictal 100 mg  Orally at bedtime  1 tablets          30 day(s)  
Active

 

 Polytrim 16853-4.1 UNIT/ML  Ophthalmic Four times a day  1 drop into affected 
eye  6h  04 Aug, 2018  9 Aug, 2018  5 day(s)  Active

 

 ProAir  (90 Base) MCG/ACT  Inhalation every 4 hrs  2 puffs as needed  
4h           Active

 

 Pristiq 50 mg  Orally Once a day  1 tablet  24h  14 Dec, 2017        Active

 

 Quetiapine Fumarate 25 MG  Orally at bedtime as needed for sleep  1 or 2 
tablet     07 Aug, 2018     30 day(s)  Active







RESULTS

No Results



PROCEDURES

No Known procedures



INSTRUCTIONS





MEDICATIONS ADMINISTERED

No Known Medications



MEDICAL (GENERAL) HISTORY







 Type  Description  Date

 

 Medical History  asthma   

 

 Medical History  depression ( prozac, pristiq, zoloft, celexa, wellbutrin)   

 

 Medical History  anxiety   

 

 Medical History  Chronic low back pain- missing vertebra   

 

 Medical History  Spina befida - close   

 

 Medical History  Degenerative disc disease   

 

 Surgical History  cholecystectomy   

 

 Hospitalization History  childbirth

## 2018-12-11 NOTE — XMS REPORT
Continuity of Care Document

 Created on: 10/05/2013



C[place]AND ACOSTA

External Reference #: [id number]

: 1980

Sex: Female



Demographics







 Address  [address] [place]  [address]

 

 Home Phone  [phone]

 

 Preferred Language  Unknown

 

 Marital Status  Unknown

 

 Restorationist Affiliation  Unknown

 

 Race  Unknown

 

 Ethnic Group  Unknown





Author







 Author  [facility]

 

 Organization  [facility]

 

 Address  Unknown

 

 Phone  Unavailable







Support







 Name  Relationship  Address  Phone

 

 KARLA JORGE ALBERTO  Next Of Kin  [address]

[place]  [address] [phone]







Care Team Providers







 Care Team Member Name  Role  Phone

 

 RANCHO JONES MD  PP  [phone]



                                            



Insurance Providers

                      





 Payer Name                    Policy Number                    Subscriber Name
                    Relationship                

 

 [employer] [facility]                    [id number]                    
Jorge Alberto Acosta                    02                 

 

 Justin Amy Sunflowr                    [id number]                    [place] Ac[place]                    01 Self / Same As Patient                



                                                                    



Advance Directives

                      





 Directive                    Response                    Recorded Date        
        

 

 Advance Directives                    N                    10/05/13 8:06pm    
            

 

 Health Care Power of                     N                    10/05/13 
8:06pm                

 

 Organ Donor                    Y                    10/05/13 8:06pm           
     



                                                                               
         



Problems

          No Known Problems or Medical conditions.                             
                                       



Family History

                      





 History                    Response                    Recorded Date/Time     
           

 

 Hx Family Cancer                    Y maternal gpa,gma and paternal gma       
             07/25/10 8:04am                

 

 Hx Family Lung Cancer                    Y                    07/25/10 8:04am 
               

 

 Hx Family Cardiac Disorders                    Y                    07/25/10 8:
04am                

 

 Hx Family Hypertension                    Y maternal gma                    07/
25/10 8:04am                



                                                                    



Social History

                      





 History                    Response                    Recorded Date/Time     
           

 

 Alcohol Use                    Denies Use                    13 12:08pm 
               

 

 Recreational Drug Use                    N                    13 12:08pm
                



                                                                               
         



Allergies, Adverse Reactions, Alerts

                      





 Allergen                    Type                    Severity                   
 Reaction                    Last Updated                

 

 DEPO PROVERA                    Allergy                    Unknown            
                             11                

 

 EES                    Allergy                    Unknown                     
                    11                



                                                                               
                             



Medications

                      





 Medication                    Dose                    Units                    
Route                    Sig                    Qty                    Days    
            

 

 Cephalexin Monohydrate (Cephalexin)                    1                    
Each                    PO                    TID                              
                            

 

 Prenatal Vit #108/Iron/Fa (Prenatal One Tablet)                    1          
          Each                    PO                    DAILY                  
                                        

 

 Progesterone                    50                    Mg                    IM
                                                                               

 

 Albuterol (Ventolin)                    1                    Inh              
      PO                    Q4                                                 
         

 

 Promethazine HCl (Phenergan 25 Mg)                    1                    Tab
                    PO                    QID PRN                    14        
                             

 

 Progesterone,Micronized (Progesterone)                    50                  
  Mg                    OR                    DAILY                            
                              

 

 Naproxen (Naprosyn)                    1                    Each              
      PO                    BID PRN                    20                      
               

 

 Hydrocodone Bit/Acetaminophen (Hydrocodon-Acetaminophen 5-325)                
    1 - 2                    Each                    PO                    Q6H 
PRN                    20                                     

 

 Cyclobenzaprine HCl (Cyclobenzaprine Hcl)                    1                
    Each                    PO                    Q8HR PRN                    
20                                     



                                                                               
                                                                               



Pregnancy

                      





 Response                    Recorded Date/Time                

 

 Status not known                    Unknown                



                                                                              



Results

                      





 Test                    Date                    Result                    
Interp.                    Ref. Range                

 

 Alanine Aminotransferase (ALT/SGPT)                    2010 5:05pm   
                 25  U/L                    L                    30-65         
       

 

 Albumin                    2010 5:05pm                    3.0  G/DL  
                  L                    3.4-5.0                

 

 Alkaline Phosphatase                    2010 5:05pm                  
  118  U/L                    N                                    

 

 Aspartate Amino Transf (AST/SGOT)                    2010 5:05pm     
               12  U/L                    L                    15-37           
     

 

 Atypical Lymphocytes                    2010 11:15am                
    1  %                                         -                

 

 BUN/Creatinine Ratio                    May 19, 2013 12:25pm                  
  13                                         -                

 

 Band Neutrophils                    2010 5:52am                    5  
%                                         -                

 

 Basophils # (Auto)                    May 19, 2013 12:25pm                    
0.0  10^3/uL                    N                    0.0-0.1                

 

 Basophils % (Manual)                    2010 5:05pm                  
  0  %                                         -                

 

 Basophils (%) (Auto)                    May 19, 2013 12:25pm                  
  0  %                    N                    0-10                

 

 Blood Urea Nitrogen                    May 19, 2013 12:25pm                    
8  MG/DL                    N                    7-18                

 

 Calcium Level                    May 19, 2013 12:25pm                    8.6  
MG/DL                    N                    8.5-10.1                

 

 Carbon Dioxide Level                    May 19, 2013 12:25pm                  
  25  MMOL/L                    N                    21-32                

 

 Chloride Level                    May 19, 2013 12:25pm                    102  
MMOL/L                    N                    101-110                

 

 Creatinine                    May 19, 2013 12:25pm                    0.6  MG/
DL                    N                    0.6-1.3                

 

 Eosinophils # (Auto)                    May 19, 2013 12:25pm                  
  0.1  10^3/uL                    N                    0.0-0.3                

 

 Eosinophils % (Manual)                    2010 5:52am                
    5  %                                         -                

 

 Eosinophils (%) (Auto)                    May 19, 2013 12:25pm                
    1  %                    N                    0-10                

 

 Glucose Level                    May 19, 2013 12:25pm                    101  
MG/DL                    N                                    

 

 Hematocrit                    May 19, 2013 12:25pm                    40  %   
                 N                    35-52                

 

 Hemoglobin                    May 19, 2013 12:25pm                    14.1  G/
DL                    N                    11.5-16.0                

 

 Human Chorionic Gonadotropin, Quant                    May 19, 2013 12:25pm   
                 [id number]  MIU/ML                    H                    -6      
          

 

 Lymphocytes # (Auto)                    May 19, 2013 12:25pm                  
  2.4  X 10^3                    N                    1.0-4.0                

 

 Lymphocytes % (Manual)                    2010 5:52am                
    35  %                                         -                

 

 Lymphocytes (%) (Auto)                    May 19, 2013 12:25pm                
    19  %                    N                    12-44                

 

 Mean Corpuscular Hemoglobin                    May 19, 2013 12:25pm           
         30  PG                    N                    25-34                

 

 Mean Corpuscular Hemoglobin Concent                    May 19, 2013 12:25pm   
                 35  G/DL                    N                    32-36        
        

 

 Mean Corpuscular Volume                    May 19, 2013 12:25pm               
     86  FL                    N                    80-99                

 

 Mean Platelet Volume                    May 19, 2013 12:25pm                  
  10.3  FL                    N                    7.4-10.4                

 

 Monocytes # (Auto)                    May 19, 2013 12:25pm                    
1.2  X 10^3                    H                    0.0-1.0                

 

 Monocytes % (Manual)                    2010 5:52am                  
  4  %                                         -                

 

 Monocytes (%) (Auto)                    May 19, 2013 12:25pm                  
  9  %                    N                    0-12                

 

 Neutrophils # (Auto)                    May 19, 2013 12:25pm                  
  9.3  X 10^3                    H                    1.8-7.8                

 

 Neutrophils % (Manual)                    2010 5:52am                
    51  %                                         -                

 

 Neutrophils (%) (Auto)                    May 19, 2013 12:25pm                
    71  %                    N                    42-75                

 

 Platelet Count                    May 19, 2013 12:25pm                    274  
10^3/uL                    N                    130-400                

 

 Potassium Level                    May 19, 2013 12:25pm                    4.7
  MMOL/L                    N                    3.6-5.0                

 

 Red Blood Count                    May 19, 2013 12:25pm                    
4.67  10^6/uL                    N                    4.35-5.85                

 

 Red Cell Distribution Width                    May 19, 2013 12:25pm           
         13.3  %                    N                    10.0-14.5             
   

 

 Sodium Level                    May 19, 2013 12:25pm                    137  
MMOL/L                    N                    135-145                

 

 Total Bilirubin                    2010 5:05pm                    0.3
  MG/DL                    N                    0.0-1.0                

 

 Total Protein                    2010 5:05pm                    6.9  G
/DL                    N                    6.4-8.2                

 

 Urine Amorphous Sediment                    2010 11:00am            
        MOD KERI URATES                    H                    -              
  

 

 Urine Bacteria                    2010 4:40pm                    
NEGATIVE                                         -                

 

 Urine Bilirubin                    2010 4:40pm                    
NEGATIVE                                         -                

 

 Urine Casts                    2010 4:40pm                    NONE   
                                      -                

 

 Urine Clarity                    2010 4:40pm                    
SLIGHTLY CLOUDY                                         -                

 

 Urine Color                    2010 4:40pm                    YELLOW 
                                        -                

 

 Urine Crystals                    2010 4:40pm                    NONE
                                         -                

 

 Urine Culture Indicated                    2010 4:40pm               
     NO                                         -                

 

 Urine Glucose (UA)                    2010 4:40pm                    
NEGATIVE                                         -                

 

 Urine Ketones                    2010 4:40pm                    
NEGATIVE                                         -                

 

 Urine Leukocyte Esterase                    2010 4:40pm              
      NEGATIVE                                         -                

 

 Urine Mucus                    2010 4:40pm                    
MODERATE                    H                    -                

 

 Urine Nitrate                    2007 5:50pm                    
Negative                                         -                

 

 Urine Nitrite                    2010 4:40pm                    
NEGATIVE                                         -                

 

 Urine Protein                    2010 4:40pm                    
NEGATIVE                                         -                

 

 Urine RBC                    2010 4:40pm                    0-2  /HPF
                                         -                

 

 Urine Specific Gravity                    2010 4:40pm                
    1.020                                         -                

 

 Urine Squamous Epithelial Cells                    2010 4:40pm       
             5-10                                         -                

 

 Urine Urobilinogen                    2010 4:40pm                    
NORMAL  MG/DL                                         -                

 

 Urine WBC                    2010 4:40pm                    0-2  /HPF
                                         -                

 

 Urine pH                    2010 4:40pm                    6.0       
                                  -                

 

 White Blood Count                    May 19, 2013 12:25pm                    
13.1  10^3/uL                    H                    4.3-11.0                

 

 Amniotic Fluid Ferning Test                    2010 6:00pm           
         NEGATIVE                                         -                

 

 Glucometer                    2010 4:21pm                    103  MG/
DL                    N                                    

 

 Lab Scanned Report                    2011 3:15pm                
    LAB Reports  [id number]                                         -             
   

 

 Estimat Glomerular Filtration Rate                    May 19, 2013 12:25pm    
                > 60                                         -                

 

 Blood Morphology Comment                    2010 5:52am              
      NORMAL                                         -                

 

 Urine RBC (Auto)                    2010 4:40pm                    
NEGATIVE                                         -                



                                                                               
                                                                               
                                                                               
                                                                               
                                                                               
                                                                               
                                                                               
                                                                               
                                                                               
                                                         



Procedures

                      





 Procedure                    Code                    Date                

 

 EPISIOTOMY                    73.6                    07/25/10                

 

 Urine Culture                                         07/05/10                



                                                                               
         



Encounters

                      





 Encounter                    Location                    Date/Time            
    

 

 Departed Emergency Room                    [facility]                     11:59am                

 

 Discharged Inpatient                    [facility]                    07/25/
10 8:30am

## 2018-12-11 NOTE — XMS REPORT
[facility]

 Created on: 2018



Kori Barksdale

External Reference #: [id number]

: 1980

Sex: Female



Demographics







 Address  [address] [place]  [address]

 

 Preferred Language  Unknown

 

 Marital Status  Unknown

 

 Taoism Affiliation  Unknown

 

 Race  Unknown

 

 Ethnic Group  Unknown





Author







 Author  RAJNI VALDIVIA

 

 [facility]

 

 Address  [address] [place]  [address]



 

 Phone  [phone]







Care Team Providers







 Care Team Member Name  Role  Phone

 

 RAJNI VALDIVIA  Unavailable  [phone]







PROBLEMS







 Type  Condition  ICD9-CM Code  QPE14-KN Code  Onset Dates  Condition Status  
SNOMED Code

 

 Problem  Strep throat     J02.0     Active  72607624

 

 Problem  Bipolar depression     F31.30     Active  44588816

 

 Problem  Social anxiety disorder     F40.10     Active  60579405

 

 Problem  Moderate major depression     F32.1     Active  514874

 

 Problem  Meralgia paresthetica of left side     G57.12     Active  95805934

 

 Problem  Congenital defect of spine     Q76.49     Active  940978157

 

 Problem  Recurrent major depressive disorder, in partial remission     F33.41 
    Active  02099307







ALLERGIES







 Substance  Reaction  Event Type  Date  Status

 

 Depo-Provera  fluid retention  Drug Allergy  04 Aug, 2018  Active

 

 Abilify  GI distress  Drug Allergy  04 Aug, 2018  Active







ENCOUNTERS







 Encounter  Location  Date  Diagnosis

 

 [facility]  [address] [place] [address]-
9993  20 Sep, 2018   

 

 [facility]  [address] [place] [address]-
1454  29 Aug, 2018  Bipolar depression F31.30

 

 [facility]  [id number] [place] [address]-
6380  07 Aug, 2018  Bipolar depression F31.30 and Social anxiety disorder F40.10

 

 [id number]  [address] [id number] [place] [id number]  
04 Aug, 2018  Hordeolum internum of right upper eyelid H00.021

 

 [facility]  [address] [place] [address]-
8999    Bipolar depression F31.30 and Social anxiety disorder F40.10

 

 [facility]  [address]  [address] [id number] [place] [id number]  
15 Justo, 2018  Congenital defect of spine Q76.49

 

 [facility]  [address]  [address] [id number] [place] [id number]  
  Moderate major depression F32.1

 

 [facility]  [address]  [address] [id number] [place], [place] [address]  
22 May, 2018  Recurrent major depressive disorder, in partial remission F33.41

 

 [facility]  [address]  [address] [id number] [place], [place] [address]  
09 May, 2018  Recurrent major depressive disorder, in partial remission F33.41

 

 [facility]  [address]  [address] [id number] [place], [place] [id number]  
04 May, 2018  Moderate major depression F32.1 and Thrush B37.0

 

 [facility]  [address]  [address] [id number] [place], [place] [id number]  
   

 

 [facility]  [address]  [address] [id number] [place], [place] [address]  
  Moderate major depression F32.1

 

 [facility]  [address]  [address] [id number] [place] [id number]  
  Moderate major depression F32.1 and Thrush B37.0

 

 [facility]  [address]  [address] [id number] [place], [place] [address]  
   

 

 [facility]  [address] [facility] [id number], [place] [id number]
-[phone]    Right acute otitis media H66.91 ; Cough R05 and Tobacco 
abuse Z72.0

 

 [facility]  [address]  [address] [id number] [place], [place] [id number]  
  Moderate major depression F32.1 and Acute hemorrhoid K64.9

 

 [facility]  [address]  [address] [id number] [place], [place] [id number]  
14 Dec, 2017  Moderate major depression F32.1 ; History of asthma Z87.09 ; 
Tobacco use Z72.0 ; Tobacco abuse counseling Z71.6 ; Meralgia paresthetica of 
left side G57.12 ; Sore throat J02.9 and Strep throat J02.0

 

 [address] [id number] [place], [place] [id number]  
12 Dec, 2017   

 

 [facility]  [address] [place] [address]
-[phone]    Pharyngitis due to other organism J02.8

 

 [facility]  [address] [address]-
7558  28 Mar, 2017   

 

 [id number] [place] [address]-
5430  14 Mar, 2017  Routine gynecological examination Z01.419

 

 [address] [place] [id number]  
04 Mar, 2017  Acute recurrent frontal sinusitis J01.11 and Fever and chills 
R50.9

 

 [facility]  [id number] [place] [address]
[phone]  10 2017  Fever, unspecified fever cause R50.9 and Acute non-
recurrent maxillary sinusitis J01.00

 

 [address] [id number] [place] [address] 26357-
2237  03 Oct, 2016   

 

 [address] [place] [id number]  
  Strep throat J02.0

 

 [address] [place] [id number]  
  Conjunctivitis of right eye, unspecified conjunctivitis type 
H10.9 and Strep throat J02.0

 

 [address] [place] [id number]  
  Sore throat J02.9

 

 [facility]  [id number] [place] [address] 20446-
9935  17 Dec, 2009   

 

 [address] [id number] [place] [address]99-
0018  17 Dec, 2009   

 

 [facility]  [address] [facility] [id number] [place] [address]-
0890  19 May, 2009   







IMMUNIZATIONS

No Known Immunizations



SOCIAL HISTORY

Never Assessed



REASON FOR VISIT

Right eye swollen flipped up eye lid last night and puss came out from under it 
looked like pocket under it. ADaniels lpn



PLAN OF CARE







 Activity  Details









  









 Follow Up  prn Reason:







VITAL SIGNS







 Height  67 in  2018

 

 Weight  248.5 lbs  2018

 

 Temperature  98.5 degrees Fahrenheit  2018

 

 Heart Rate  88 bpm  2018

 

 Respiratory Rate  18   2018

 

 BMI  38.92 kg/m2  2018

 

 Blood pressure systolic  120 mmHg  2018

 

 Blood pressure diastolic  82 mmHg  2018







MEDICATIONS







 Medication  Instructions  Dosage  Frequency  Start Date  End Date  Duration  
Status

 

 ProAir  (90 Base) MCG/ACT  Inhalation every 4 hrs  2 puffs as needed  
4h           Active

 

 Polytrim 45233-2.1 UNIT/ML  Ophthalmic Four times a day  1 drop into affected 
eye  6h  04 Aug, 2018  9 Aug, 2018  5 day(s)  Active

 

 Pristiq 50 mg  Orally Once a day  1 tablet  24h  14 Dec, 2017        Active

 

 Lamictal 25 MG  Orally for 2 weeks. On  take 2 tablets at bedtime and 
continue  1 tablets          30 day(s)  Active







RESULTS

No Results



PROCEDURES

No Known procedures



INSTRUCTIONS





MEDICATIONS ADMINISTERED

No Known Medications



MEDICAL (GENERAL) HISTORY







 Type  Description  Date

 

 Medical History  asthma   

 

 Medical History  depression ( prozac, pristiq, zoloft, celexa, wellbutrin)   

 

 Medical History  anxiety   

 

 Medical History  Chronic low back pain- missing vertebra   

 

 Medical History  Spina befida - close   

 

 Medical History  Degenerative disc disease   

 

 Surgical History  cholecystectomy   

 

 Hospitalization History  childbirth

## 2018-12-11 NOTE — DISCHARGE INST-WOMEN'S SERVICE
Discharge Inst-Women's Serv


Depart Medication/Instructions


New, Converted or Re-Newed RX:  RX on Chart


Final Diagnosis


menorrhagia


dysmenorrhea


uterine prolapse





Consults/Follow Up


Additional Follow Up:  Yes (1 -2 weeks with Paulette.  12 weeks with Fam)





Activity


Activity:  Activity as Tolerated


Driving Instructions:  No Driving for 1 Week


NO SMOKING:  NO SMOKING


Nothing Inside Vagina:  No Douching, No [place], No Tampons





Diet


Discharge Diet:  No Restrictions


Symptoms to Report to :  Swelling Increased, Bleeding Excessive, Pain 

Increased, Fever Over 101 Degrees F, Vaginal Bleeding Increase, Cramps in Feet 

or Legs, Vaginal Discharge Foul


For Any Problems or Questions:  Contact Your Physician





Skin/Wound Care


Infection Signs and Symptoms:  Increased Redness, Foul Odor of Wound, Increased 

Drainage, Skin Itchy or Has a Rash, Increased Swelling, Temperature Above 101  F


Operative Area Clean and Dry:  You May Remove Bandage (remove in 3 days.  If 

soiled or wet may replace.  Remove in shower and do not pull off glue)


Stitches/Staples/Dermabond:  Dermabond


Bathing Instructions:  QUIN Morton DO Dec 11, 2018 16:39

## 2018-12-11 NOTE — XMS REPORT
[facility]

 Created on: 2018



Kori Barksdale

External Reference #: [id number]

: 1980

Sex: Female



Demographics







 Address  [address] [place]  [address]

 

 Preferred Language  Unknown

 

 Marital Status  Unknown

 

 Adventism Affiliation  Unknown

 

 Race  Unknown

 

 Ethnic Group  Unknown





Author







 Author  EDGAR AUGUST

 

 [facility]

 

 Address  Unknown

 

 Phone  Unavailable







Care Team Providers







 Care Team Member Name  Role  Phone

 

 EDGAR AUGUST  Unavailable  Unavailable







PROBLEMS







 Type  Condition  ICD9-CM Code  NRW71-EJ Code  Onset Dates  Condition Status  
SNOMED Code

 

 Problem  Strep throat     J02.0     Active  22675176

 

 Problem  Bipolar depression     F31.30     Active  05614596

 

 Problem  Social anxiety disorder     F40.10     Active  68850012

 

 Problem  Moderate major depression     F32.1     Active  705321

 

 Problem  Meralgia paresthetica of left side     G57.12     Active  65828230

 

 Problem  Congenital defect of spine     Q76.49     Active  903219934

 

 Problem  Recurrent major depressive disorder, in partial remission     F33.41 
    Active  07107426







ALLERGIES

No Information



ENCOUNTERS







 Encounter  Location  Date  Diagnosis

 

 [facility]  [id number] [address]  07 Aug, 2018   

 

 [facility]  [address] [facility] [id number], [place] [address]-
7466    Bipolar depression F31.30 and Social anxiety disorder F40.10

 

 [facility]  [address]  [address] [id number] [place] [id number]  
15 Justo, 2018  Congenital defect of spine Q76.49

 

 [facility]  [address]  [address] [id number] [place], [place] [id number]  
  Moderate major depression F32.1

 

 [facility]  [address]  [address] [id number] [place] [id number]  
22 May, 2018  Recurrent major depressive disorder, in partial remission F33.41

 

 [facility]  [address]  [address] [id number] [place] [id number]  
09 May, 2018  Recurrent major depressive disorder, in partial remission F33.41

 

 [facility]  [address]  [address] [id number] [place], [place] [id number]  
04 May, 2018  Moderate major depression F32.1 and Thrush B37.0

 

 [facility]  [address]  [address] [id number] [id number]  
   

 

 [facility]  [address]  [address] [id number] [id number]  
  Moderate major depression F32.1

 

 [facility]  [address] [facility] [address] [id number] [place], [place] [id number]  
  Moderate major depression F32.1 and Thrush B37.0

 

 [facility]  [address]  [address] [id number] [place] [id number]  
   

 

 [facility]  [address] [id number] [address]
-[address]    Right acute otitis media H66.91 ; Cough R05 and Tobacco 
abuse Z72.0

 

 [facility]  [address] [place] [address] AV [id number] [place], [place] [id number]  
  Moderate major depression F32.1 and Acute hemorrhoid K64.9

 

 [facility]  [address] [place] [address] A[address] [id number] [place] [id number]  
14 Dec, 2017  Moderate major depression F32.1 ; History of asthma Z87.09 ; 
Tobacco use Z72.0 ; Tobacco abuse counseling Z71.6 ; Meralgia paresthetica of 
left side G57.12 ; Sore throat J02.9 and Strep throat J02.0

 

 [facility]  [address] [place] [address] A[address] [id number] [place] [phone]  
12 Dec, 2017   

 

 [facility]  [address] [facility] [id number], [place] [address]
-[phone]    Pharyngitis due to other organism J02.8

 

 [facility]  [address] [id number], [place] [address]-
6079  28 Mar, 2017   

 

 [facility]  [address] [id number], [place] [address]-
6530  14 Mar, 2017  Routine gynecological examination Z01.419

 

 [facility] [id number] [place], [place] [id number]  
04 Mar, 2017  Acute recurrent frontal sinusitis J01.11 and Fever and chills 
R50.9

 

 [facility]  [address]00565100[place], [place] [address]
-[address]  10 2017  Fever, unspecified fever cause R50.9 and Acute non-
recurrent maxillary sinusitis J01.00

 

 [facility]  [address]00565100[place], [place] [address]
2546  03 Oct, 2016   

 

 [address] [id number] [place], [place] [id number]  
  Strep throat J02.0

 

 [address] [place] [id number]  
  Conjunctivitis of right eye, unspecified conjunctivitis type 
H10.9 and Strep throat J02.0

 

 [address]100[place], [place] [id number]  
  Sore throat J02.9

 

 [facility]  [address]00565100[place] [address]-
6272  17 Dec, 2009   

 

 [facility]  [address] [place] [address]28-
5623  17 Dec, 2009   

 

 [facility]  [address]00565100[place] [address]-
3886  19 May, 2009   







IMMUNIZATIONS

No Known Immunizations



SOCIAL HISTORY

Never Assessed



REASON FOR VISIT

[facility] Contact



PLAN OF CARE







 Activity  Details









  









 Follow Up  Will likely use services in near future. Reason:hx. of depression







VITAL SIGNS





MEDICATIONS

Unknown Medications



RESULTS

No Results



PROCEDURES

No Known procedures



INSTRUCTIONS





MEDICATIONS ADMINISTERED

No Known Medications



MEDICAL (GENERAL) HISTORY







 Type  Description  Date

 

 Medical History  asthma   

 

 Medical History  depression ( prozac, pristiq, zoloft, celexa, wellbutrin)   

 

 Medical History  anxiety   

 

 Medical History  Chronic low back pain- missing vertebra   

 

 Medical History  Spina befida - close   

 

 Medical History  Degenerative disc disease   

 

 Surgical History  cholecystectomy   

 

 Hospitalization History  childbirth

## 2018-12-11 NOTE — XMS REPORT
[facility]

 Created on: 2017



Kori Barksdale

External Reference #: [id number]

: 1980

Sex: Female



Demographics







 Address  [address] [place] [address]  [address]

 

 Preferred Language  Unknown

 

 Marital Status  Unknown

 

 Zoroastrianism Affiliation  Unknown

 

 Race  Unknown

 

 Ethnic Group  Unknown





Author







 Author  ZAHRA BURLESON

 

 Organization  [facility]

 

 Address  [address] [place]  [address]



 

 Phone  [phone]







Care Team Providers







 Care Team Member Name  Role  Phone

 

 ZAHRA BURLESON  Unavailable  [phone]







PROBLEMS







 Type  Condition  ICD9-CM Code  JHB96-NQ Code  Onset Dates  Condition Status  
SNOMED Code

 

 Problem  Strep throat     J02.0     Active  40160308







ALLERGIES







 Substance  Reaction  Event Type  Date  Status

 

 Depo-Provera  fluid retention  Drug Allergy  04 Mar, 2017  Active







SOCIAL HISTORY

Never Assessed



PLAN OF CARE







 Activity  Details









  









 Follow Up  2 - 3 Days Reason:if s/s not improving







VITAL SIGNS







 Height  66 in  2017

 

 Weight  244.3 lbs  2017

 

 Temperature  97.9 degrees Fahrenheit  2017

 

 Heart Rate  98 bpm  2017

 

 Respiratory Rate  20   2017

 

 BMI  39.43 kg/m2  2017

 

 Blood pressure systolic  118 mmHg  2017

 

 Blood pressure diastolic  68 mmHg  2017







MEDICATIONS







 Medication  Instructions  Dosage  Frequency  Start Date  End Date  Duration  
Status

 

 Amoxicillin-Pot Clavulanate 875-125 MG  Orally every 12 hrs  1 tablet  12h  04 
Mar, 2017  14 Mar, 2017  10 day(s)  Active

 

 Pseudoephedrine HCl 60 mg  Orally every 6 hrs for sinus congestion  1 tablet 
as needed     04 Mar, 2017     14 days  Active







RESULTS







 Name  Result  Date  Reference Range

 

 INFLUENZA A & B (IN HOUSE)     2017   

 

 INFLUENZA A  NEGATIVE      

 

 INFLUENZA B  NEGATIVE      

 

 Control  POSITIVE      

 

 Lot #  [id number]      

 

 Exp date  19      

 

 STREP A (IN HOUSE)     2017   

 

 STREP A  POSITIVE      

 

 Control  POSITIVE      

 

 Lot #  [id number]      

 

 Exp date  18      







PROCEDURES







 Procedure  Date Ordered  Result  Body Site

 

 STREP A ASSAY W/OPTIC  2017      

 

 INFLUENZA ASSAY W/OPTIC  2017      







IMMUNIZATIONS

No Known Immunizations



MEDICAL (GENERAL) HISTORY







 Type  Description  Date

 

 Surgical History  cholecystectomy   

 

 Hospitalization History  childbirth

## 2018-12-11 NOTE — XMS REPORT
[facility]

 Created on: 2018



Kori Barksdale

External Reference #: [id number]

: 1980

Sex: Female



Demographics







 Address  [address] [place]  [address]

 

 Preferred Language  Unknown

 

 Marital Status  Unknown

 

 Mandaeism Affiliation  Unknown

 

 Race  Unknown

 

 Ethnic Group  Unknown





Author







 Author  BELA MCKEON

 

 [facility]

 

 Address  [address] [place]  [address]



 

 Phone  [phone]







Care Team Providers







 Care Team Member Name  Role  Phone

 

 LINDA  BELA  Unavailable  [phone]







PROBLEMS







 Type  Condition  ICD9-CM Code  TPF78-JY Code  Onset Dates  Condition Status  
SNOMED Code

 

 Problem  Strep throat     J02.0     Active  50970796

 

 Problem  Bipolar depression     F31.30     Active  56758787

 

 Problem  Social anxiety disorder     F40.10     Active  14019796

 

 Problem  Moderate major depression     F32.1     Active  017852

 

 Problem  Meralgia paresthetica of left side     G57.12     Active  77077380

 

 Problem  Congenital defect of spine     Q76.49     Active  937931824

 

 Problem  Recurrent major depressive disorder, in partial remission     F33.41 
    Active  39400543







ALLERGIES

No Information



ENCOUNTERS







 Encounter  Location  Date  Diagnosis

 

 [facility]  [address] [place] [address]-
1390  30 Oct, 2018   

 

 [facility]  [id number] [place] [address]-
0639  20 Sep, 2018  Bipolar depression F31.30 and Social anxiety disorder F40.10

 

 [facility]  [address] [place] [address]-
0939  29 Aug, 2018  Bipolar depression F31.30

 

 [facility]  [id number] [place] [address]-
6478  07 Aug, 2018  Bipolar depression F31.30 and Social anxiety disorder F40.10

 

 [facility]  [address] [id number], [place] [id number]  
04 Aug, 2018  Hordeolum internum of right upper eyelid H00.021

 

 [facility]  [id number] [place] [address]-
6775    Bipolar depression F31.30 and Social anxiety disorder F40.10

 

 [facility]  [address]  [address] [id number], [place] [id number]  
15 2018  Congenital defect of spine Q76.49

 

 [facility]  [address]  [address] [id number], [place] [id number]  
  Moderate major depression F32.1

 

 [facility]  [address]  [address] [id number], [place] [id number]  
22 May, 2018  Recurrent major depressive disorder, in partial remission F33.41

 

 [facility]  [address]  [address] [id number], [place] [id number]  
09 May, 2018  Recurrent major depressive disorder, in partial remission F33.41

 

 [facility]  [address]  [address] [id number], [place] [id number]  
04 May, 2018  Moderate major depression F32.1 and Thrush B37.0

 

 [facility]  [address]  [address] [id number], [place] [id number]  
   

 

 [facility]  [address]  [address] [id number], [place] [id number]  
  Moderate major depression F32.1

 

 [facility]  [address]  [address] [id number], [place] [id number]  
  Moderate major depression F32.1 and Thrush B37.0

 

 [facility]  [address]  [address] [id number], [place] [id number]  
   

 

 [facility]  [address] [facility] [id number], [place] [id number]
-[address]    Right acute otitis media H66.91 ; Cough R05 and Tobacco 
abuse Z72.0

 

 [facility]  [address]  [address] [id number], [place] [id number]  
  Moderate major depression F32.1 and Acute hemorrhoid K64.9

 

 [facility]  [address]  [address] [id number], [place] [id number]  
14 Dec, 2017  Moderate major depression F32.1 ; History of asthma Z87.09 ; 
Tobacco use Z72.0 ; Tobacco abuse counseling Z71.6 ; Meralgia paresthetica of 
left side G57.12 ; Sore throat J02.9 and Strep throat J02.0

 

 [id number], [place] [id number]  
12 Dec, 2017   

 

 [facility]  [address] [place] [address]
-[phone]    Pharyngitis due to other organism J02.8

 

 [facility]  [id number] [place] [address]-
1553  28 Mar, 2017   

 

 [facility]  [address] [address]-
9193  14 Mar, 2017  Routine gynecological examination Z01.419

 

 [address]65100[place], [place] [id number]  
04 Mar, 2017  Acute recurrent frontal sinusitis J01.11 and Fever and chills 
R50.9

 

 [facility]  [address] [place] [address]
-[phone]  10 Feb, 2017  Fever, unspecified fever cause R50.9 and Acute non-
recurrent maxillary sinusitis J01.00

 

 [facility]  [address] [id number], [place] [address]-
4836  03 Oct, 2016   

 

 [address] [id number], [place] [id number]  
  Strep throat J02.0

 

 [address] [place] [id number]  
06 2016  Conjunctivitis of right eye, unspecified conjunctivitis type 
H10.9 and Strep throat J02.0

 

 [address] [place] [id number]  
  Sore throat J02.9

 

 [facility]  [address] [place] [address]-
4050  17 Dec, 2009   

 

 [facility]  [address] 19922KS [place] [address]-
8736  17 Dec, 2009   

 

 [facility]  [address] [facility] [id number] [place] [address]-
4675  19 May, 2009   







IMMUNIZATIONS

No Known Immunizations



SOCIAL HISTORY

Never Assessed



REASON FOR VISIT

medication



PLAN OF CARE





VITAL SIGNS





MEDICATIONS







 Medication  Instructions  Dosage  Frequency  Start Date  End Date  Duration  
Status

 

 Lamictal 200 MG  Orally at bedtime  1 tablets             Active







RESULTS

No Results



PROCEDURES

No Known procedures



INSTRUCTIONS





MEDICATIONS ADMINISTERED

No Known Medications



MEDICAL (GENERAL) HISTORY







 Type  Description  Date

 

 Medical History  asthma   

 

 Medical History  depression ( prozac, pristiq, zoloft, celexa, wellbutrin)   

 

 Medical History  anxiety   

 

 Medical History  Chronic low back pain- missing vertebra   

 

 Medical History  Spina befida - close   

 

 Medical History  Degenerative disc disease   

 

 Surgical History  cholecystectomy   

 

 Hospitalization History  childbirth

## 2018-12-11 NOTE — XMS REPORT
[facility]

 Created on: 2018



Kori Barksdale

External Reference #: [id number]

: 1980

Sex: Female



Demographics







 Address  [address] [place]  [address]

 

 Preferred Language  Unknown

 

 Marital Status  Unknown

 

 Yarsanism Affiliation  Unknown

 

 Race  Unknown

 

 Ethnic Group  Unknown





Author







 Author  HERBER AZEVEDO

 

 [facility]

 

 Address  [address] [place]  [address]



 

 Phone  [phone]







Care Team Providers







 Care Team Member Name  Role  Phone

 

 HERBER AZEVEDO  Unavailable  [phone]







PROBLEMS







 Type  Condition  ICD9-CM Code  VGT25-CT Code  Onset Dates  Condition Status  
SNOMED Code

 

 Problem  Strep throat     J02.0     Active  25289443

 

 Problem  Bipolar depression     F31.30     Active  77279443

 

 Problem  Social anxiety disorder     F40.10     Active  41694299

 

 Problem  Moderate major depression     F32.1     Active  132054

 

 Problem  Meralgia paresthetica of left side     G57.12     Active  16768535

 

 Problem  Congenital defect of spine     Q76.49     Active  325034319

 

 Problem  Recurrent major depressive disorder, in partial remission     F33.41 
    Active  63893754







ALLERGIES







 Substance  Reaction  Event Type  Date  Status

 

 Depo-Provera  fluid retention  Drug Allergy    Active







ENCOUNTERS







 Encounter  Location  Date  Diagnosis

 

 [facility]  [id number], [place] [address]  20 Sep, 2018   

 

 [facility]  [address] [place] [address]  07 Aug, 2018  Bipolar depression F31.30 and Social anxiety disorder F40.10

 

 [address] [address]  [address] [id number] [id number]  
04 Aug, 2018  Hordeolum internum of right upper eyelid H00.021

 

 [facility]  [id number] [address]    Bipolar depression F31.30 and Social anxiety disorder F40.10

 

 [facility]  [address] [id number] [place], [place] [address]  
15 Justo, 2018  Congenital defect of spine Q76.49

 

 [address] [address]  [address] [id number] [place] [id number]  
  Moderate major depression F32.1

 

 [facility]  [address]  [address] [id number] [place] [address]  
22 May, 2018  Recurrent major depressive disorder, in partial remission F33.41

 

 [facility]  [address]  [address] [id number] [place] [address]  
09 May, 2018  Recurrent major depressive disorder, in partial remission F33.41

 

 [facility]  [address]  [address] [id number] [place] [address]  
04 May, 2018  Moderate major depression F32.1 and Thrush B37.0

 

 [facility]  [address]  [address] [id number] [place], [place] [id number]  
   

 

 [facility]  [address]  [address] [id number] [place] [address]  
  Moderate major depression F32.1

 

 [facility]  [address]  [address] [id number] [place] [address]  
  Moderate major depression F32.1 and Thrush B37.0

 

 [facility]  [address]  [address] [id number] [place] [id number]  
   

 

 [facility]  [address] [facility]note [facility] [id number] [id number]    Right acute otitis media H66.91 ; Cough R05 and Tobacco 
abuse Z72.0

 

 [facility]  [address]  [address] [id number] [place] [address]  
  Moderate major depression F32.1 and Acute hemorrhoid K64.9

 

 [facility]  [address]  [address] [id number] [place] [id number]  
14 Dec, 2017  Moderate major depression F32.1 ; History of asthma Z87.09 ; 
Tobacco use Z72.0 ; Tobacco abuse counseling Z71.6 ; Meralgia paresthetica of 
left side G57.12 ; Sore throat J02.9 and Strep throat J02.0

 

 [facility]  [address]  [address] [id number] [place] [address]  
12 Dec, 2017   

 

 [facility]  [address], [place] [address]    Pharyngitis due to other organism J02.8

 

 [facility]  [address]00565100[place], [place] [address]
15[address]  28 Mar, 2017   

 

 [facility]  [id number] [place] [address]-
2052  14 Mar, 2017  Routine gynecological examination Z01.419

 

 [address] [id number] [place] [id number]  
04 Mar, 2017  Acute recurrent frontal sinusitis J01.11 and Fever and chills 
R50.9

 

 [facility]  [address]00565100[place], [place] [address]  10 2017  Fever, unspecified fever cause R50.9 and Acute non-
recurrent maxillary sinusitis J01.00

 

 [place] [address], [place] [address]  03 Oct, 2016   

 

 [address] [id number] [place] [address]  
  Strep throat J02.0

 

 [id number] [place] [id number]  
  Conjunctivitis of right eye, unspecified conjunctivitis type 
H10.9 and Strep throat J02.0

 

 [address] [id number] [place] [address]  
  Sore throat J02.9

 

 [place] [address], [place] [address]  17 Dec, 2009   

 

 [place] [address] [address]100[place] [address]  17 Dec, 2009   

 

 [facility]  [address]00565100[place] [address]  19 May, 2009   







IMMUNIZATIONS

No Known Immunizations



SOCIAL HISTORY

Never Assessed



REASON FOR VISIT

Depression follow up, doing better but still not great---ten RN



PLAN OF CARE







 Activity  Details









  









 Follow Up  prn Reason:







VITAL SIGNS







 Height  67 in  2018

 

 Weight  244.3 lbs  2018

 

 Temperature  98.1 degrees Fahrenheit  2018

 

 Heart Rate  84 bpm  2018

 

 Respiratory Rate  16   2018

 

 BMI  38.26 kg/m2  2018

 

 Blood pressure systolic  123 mmHg  2018

 

 Blood pressure diastolic  80 mmHg  2018







MEDICATIONS







 Medication  Instructions  Dosage  Frequency  Start Date  End Date  Duration  
Status

 

 Aripiprazole 5 mg  Orally Once a day  1/2 tablet  24h  13 2018        
Active

 

 ProAir  (90 Base) MCG/ACT  Inhalation every 4 hrs  2 puffs as needed  
4h           Active

 

 Pristiq 50 mg  Orally Once a day  1 tablet  24h  14 Dec, 2017        Active







RESULTS

No Results



PROCEDURES

No Known procedures



INSTRUCTIONS





MEDICATIONS ADMINISTERED

No Known Medications



MEDICAL (GENERAL) HISTORY







 Type  Description  Date

 

 Medical History  asthma   

 

 Medical History  depression ( prozac, pristiq, zoloft, celexa, wellbutrin)   

 

 Medical History  anxiety   

 

 Medical History  Chronic low back pain- missing vertebra   

 

 Medical History  Spina befida - close   

 

 Medical History  Degenerative disc disease   

 

 Surgical History  cholecystectomy   

 

 Hospitalization History  childbirth

## 2018-12-11 NOTE — XMS REPORT
[facility]

 Created on: 2018



Kori Barksdale

External Reference #: [id number]

: 1980

Sex: Female



Demographics







 Address  [address] [place]  [address]

 

 Preferred Language  Unknown

 

 Marital Status  Unknown

 

 Druze Affiliation  Unknown

 

 Race  Unknown

 

 Ethnic Group  Unknown





Author







 Author  EDGAR Clay

 

 [facility]

 

 Address  Unknown

 

 Phone  Unavailable







Care Team Providers







 Care Team Member Name  Role  Phone

 

 EDGAR Clay  Unavailable  Unavailable







PROBLEMS







 Type  Condition  ICD9-CM Code  TEL56-BQ Code  Onset Dates  Condition Status  
SNOMED Code

 

 Problem  Strep throat     J02.0     Active  72499038

 

 Problem  Bipolar depression     F31.30     Active  45307926

 

 Problem  Social anxiety disorder     F40.10     Active  31288632

 

 Problem  Moderate major depression     F32.1     Active  197358

 

 Problem  Meralgia paresthetica of left side     G57.12     Active  99475982

 

 Problem  Congenital defect of spine     Q76.49     Active  197509521

 

 Problem  Recurrent major depressive disorder, in partial remission     F33.41 
    Active  14456938







ALLERGIES

No Information



ENCOUNTERS







 Encounter  Location  Date  Diagnosis

 

 [facility]  [address]00565100[place], [place] [address]  20 Sep, 2018   

 

 [facility]  [id number] [place] [address]-
[address]  07 Aug, 2018  Bipolar depression F31.30 and Social anxiety disorder F40.10

 

 [address]  [address] [id number] [address]  
04 Aug, 2018  Hordeolum internum of right upper eyelid H00.021

 

 [id number] [address]    Bipolar depression F31.30 and Social anxiety disorder F40.10

 

 [facility]  [address]  [address] [id number], [place] [address]  
15 Justo, 2018  Congenital defect of spine Q76.49

 

 [facility]  [address]  [address] [id number][place] [id number]  
  Moderate major depression F32.1

 

 [facility]  [address]  [address] [id number], [place] [address]  
22 May, 2018  Recurrent major depressive disorder, in partial remission F33.41

 

 [facility]  [address]  [address] [id number] [id number]  
09 May, 2018  Recurrent major depressive disorder, in partial remission F33.41

 

 [facility]  [address]  [address] [id number] [address]  
04 May, 2018  Moderate major depression F32.1 and Thrush B37.0

 

 [facility]  [address]  [address] [id number][place] [id number]  
   

 

 [facility]  [address]  [address] [id number], [place] [address]  
  Moderate major depression F32.1

 

 [facility]  [address]  [address] [id number], [place] [address]  
  Moderate major depression F32.1 and Thrush B37.0

 

 [facility]  [address]  [address] [id number], [place] [id number]  
   

 

 [facility]  [address]00565100[place], [place] [address]    Right acute otitis media H66.91 ; Cough R05 and Tobacco 
abuse Z72.0

 

 [facility]  [address] [facility] [address] [id number], [place] [address]  
  Moderate major depression F32.1 and Acute hemorrhoid K64.9

 

 [facility]  [address] [place] [address] AV [id number], [place] [address]  
14 Dec, 2017  Moderate major depression F32.1 ; History of asthma Z87.09 ; 
Tobacco use Z72.0 ; Tobacco abuse counseling Z71.6 ; Meralgia paresthetica of 
left side G57.12 ; Sore throat J02.9 and Strep throat J02.0

 

 [facility]  [address]  [address] [id number], [place] [id number]  
12 Dec, 2017   

 

 [facility]  [address]00565100[place], [place] [address]    Pharyngitis due to other organism J02.8

 

 [facility]  [address]00565100[place], [place] [address]  28 Mar, 2017   

 

 [facility]  [id number] [place] [address]-
39[address]  14 Mar, 2017  Routine gynecological examination Z01.419

 

 [address] [id number], [place] [address]  
04 Mar, 2017  Acute recurrent frontal sinusitis J01.11 and Fever and chills 
R50.9

 

 [facility]  [address]00565100[place], [place] [address]  10 2017  Fever, unspecified fever cause R50.9 and Acute non-
recurrent maxillary sinusitis J01.00

 

 [place] [address]00565100[place] [address]-
[address]  03 Oct, 2016   

 

 [address] [id number], [place] [id number]  
  Strep throat J02.0

 

 [address] [place] [id number]  
  Conjunctivitis of right eye, unspecified conjunctivitis type 
H10.9 and Strep throat J02.0

 

 [address] [id number], [place] [address]  
  Sore throat J02.9

 

 [facility]  [address]00565100[place], [place] [address]  17 Dec, 2009   

 

 [facility]  [address]00565100[place], [place] [address]  17 Dec, 2009   

 

 [id number] [place] [address]
1870  19 May, 2009   







IMMUNIZATIONS

No Known Immunizations



SOCIAL HISTORY

Never Assessed



REASON FOR VISIT

 f/u



PLAN OF CARE







 Activity  Details









  









 Follow Up  Next available Reason:depressed mood.







VITAL SIGNS





MEDICATIONS

Unknown Medications



RESULTS

No Results



PROCEDURES







 Procedure  Date Ordered  Result  Body Site

 

 Psychotherapy, patient &/family, 30 minutes, established patient  May 22, 2018
      







INSTRUCTIONS





MEDICATIONS ADMINISTERED

No Known Medications



MEDICAL (GENERAL) HISTORY







 Type  Description  Date

 

 Medical History  asthma   

 

 Medical History  depression ( prozac, pristiq, zoloft, celexa, wellbutrin)   

 

 Medical History  anxiety   

 

 Medical History  Chronic low back pain- missing vertebra   

 

 Medical History  Spina befida - close   

 

 Medical History  Degenerative disc disease   

 

 Surgical History  cholecystectomy   

 

 Hospitalization History  childbirth

## 2018-12-11 NOTE — OPERATIVE REPORT
Operative Report


Date of Procedure/Surgery


Dec 11, 2018


Surgeon (s)


QUIN ALTAMIRANO DO


Assistant (s):  NASIM Samuel asst. needed to retract important neurovascular 

structures





Post-Operative Diagnosis





Menorrhagia, dysmenorrhea, uterine prolapse





Procedure Performed





RaTH, bilateral salpingctomy





Description of Procedure


Anesthesia Type:  General


Estimated blood loss (mL):  minimal


Specimen(s) collected/removed


uterus tubes and ovaries


Description of the Procedure


After informed consent was obtained, patient was taken into the operating room 

where general anesthetic was found to be adequate.  She was prepped and draped 

in the usual sterile fashion in the dorsal lithotomy position.  





A Orellana catheter was placed.  A speculum was placed in the vagina.   The 

anterior lip of the cervix was grasped with a sharp toothed tenaculum. The 

uterus was sounded and depth was approximately 10 cm centimeters. I placed the 

Nai device.  And then set the Nai to 10and a 3.5 cm collar was advanced over 

the cervix.  I inserted the Nai  without difficulty, inflating the balloon and 

securing it around the fornix of the cervix.  The collar was then secured with 

sutures at 12 o'clock.  





Attention was then turned to the patient's abdomen.  A supraumbilical incision 

was made about 10 mm.  A Veress needle was inserted and I confirmed 

intraabdominal placement with a drop in pressure and the saline drop test.   I 

then insufflated the abdomen to a maximum of 15 mmHg with warmed CO2 gas.  I 

then placed an 10 mm trocar and then the Da Marcello camera and intraperitoneal 

placement was confirmed.    I then determined the procedure could be continued 

robotically.   





The first robotic port was placed about 15 cm lateral to the right and left of 

the umbilical placement and slightly inferior.  These are both 8 mm trocars.  

These were placed under direct visualization of the laparoscope.  0.25% 

Marcaine was injected prior to placement of all trocars.  When all placements 

were confirmed, the patient was placed in steep Trendelenburg allowing adequate 

visualization and the robot was brought in for docking.  The docking was 

accomplished without difficulty. A survey of the pelvis confirmed the above 

mentioned findings.  





Now I was able to visualize the round ligaments bilaterally and grasped them 

and cauterized with bipolar cautery and then cut with my dimitry.   At this point

, I then did bilateral salpingectomy.  I cut along the mesosalpinx with the 

monopolar dimitry and then dissected up to the cornu bilaterally.   I then moved 

to the  uteroovarian ligaments.  I sealed the vessel and transected bilaterally 

using the bipolar cautery and then cut with the monopolar dimitry.   I then 

moved my dissection to the posterior leaves of the broad ligament.  I dissected 

the posterior leaves of the broad ligament off the uterine arteries 

skeletonizing them bilaterally.   I then took a second clamp with the bipolar 

cautery and with the dimitry, transected the vessels away from the lateral 

aspect to the cervical stroma.   I dissected the anterior peritoneum off the 

lower uterine segment.    I continually pushed the bladder back and  I took 

excessively great care and I was eventually able to dissect the vesicouterine 

peritoneum off the lower uterine segment.  


 


The appendix was seen and found to be normal in appearance.  There were some 

adhesions in the left pelvis that needed to be taken down before I could do the 

salpingectomy and this was accomplished without excessive bleeding. 





I then dissected in a V fashion towards the midline between the uterosacral 

ligaments.  This allowed me to skeletonize the uterine vessels bilaterally.    

The balloon on the NAI was insufflated.  This allowed me to see the NAI 

circumferentially.  I then performed a colpotomy anteriorly and  then amputate 

with cervix away from the vaginal fornix.  I then continued the colpotomy 

circumferentially.  Once this was performed, the assistant removed the uterus 

through the vagina.  A sponge was left in the vagina to maintain 

pneumoperitoneum. 


 





I then began closure of the vaginal cuff.  I closed the apices of the vaginal 

cuff with 2-0 Vicryl V lock sutures with a colposuspension through the 

uterosacral ligaments.  This suspended the apices of the vaginal cuff.   I 

extended this to the midline from both sides and overlapped the V lock sutures 

in the midline.   Excellent closure is noted and hemostasis is achieved.  I was 

able to now see the ureters bilaterally and these are well away from the area 

of dissection.  The robot was now undocked and returned to laparoscopy.  The 

pelvis was irrigated and James-Seal was placed along the vaginal cuff. 








All the needles were removed from the patient's abdomen. The gas and 

instruments were removed from the abdomen.  The supraumbilical fascial 

incisions was closed with 0 vicryl in a figure of 8 fashion and then all the 

skin incisions were closed with 4-0 Monocryl and then skin glue was placed.  

Bandages were placed. Sponge, lap, needle and instrument counts were correct 

times two.


Findings of the Procedure


enlarged boggy uterus, normal ovaries, tubal cysts


adhesions, left pelvic sidewall, filmy to anterior peritoneum





Allergies and Home Medications


Allergies


Coded Allergies:  


     medroxyprogesterone acetate (Unverified  Allergy, Unknown, 11/4/13)





Home Medications


Albuterol Sulfate 1 Puff Puff, 2 PUFF IH Q4H PRN for WHEEZING, (Reported)


   1 PUFF = 90 MCG 


Lamotrigine 200 Mg Tablet, 200 MG PO HS, (Reported)


Lurasidone HCl 20 Mg Tablet, 20 MG PO DAILY@1800, (Reported)


   TAKE WITH EVENING MEAL 


Ramelteon 8 Mg Tablet, 8 MG PO HS PRN for SLEEP, (Reported)





Patient Home Medication List


Home Medication List Reviewed:  Yes











QUIN ALTAMIRANO DO Dec 11, 2018 09:41

## 2018-12-11 NOTE — XMS REPORT
[facility]

 Created on: 2018



Kori Barksdale

External Reference #: [id number]

: 1980

Sex: Female



Demographics







 Address  [address] [place]  [address]

 

 Preferred Language  Unknown

 

 Marital Status  Unknown

 

 Taoism Affiliation  Unknown

 

 Race  Unknown

 

 Ethnic Group  Unknown





Author







 Author  HERBER AZEVEDO

 

 [facility]

 

 Address  [address] [place]  [address]



 

 Phone  [phone]







Care Team Providers







 Care Team Member Name  Role  Phone

 

 HERBER AZEVEDO  Unavailable  [phone]







PROBLEMS







 Type  Condition  ICD9-CM Code  STC13-TY Code  Onset Dates  Condition Status  
SNOMED Code

 

 Problem  Strep throat     J02.0     Active  69794332

 

 Problem  Bipolar depression     F31.30     Active  95500943

 

 Problem  Social anxiety disorder     F40.10     Active  49958034

 

 Problem  Moderate major depression     F32.1     Active  787529

 

 Problem  Meralgia paresthetica of left side     G57.12     Active  96662889

 

 Problem  Congenital defect of spine     Q76.49     Active  667710410

 

 Problem  Recurrent major depressive disorder, in partial remission     F33.41 
    Active  08205570







ALLERGIES

No Information



ENCOUNTERS







 Encounter  Location  Date  Diagnosis

 

 [facility]  [address] [id number] [address]  07 Aug, 2018   

 

 [facility]  [id number], [place] [address]    Bipolar depression F31.30 and Social anxiety disorder F40.10

 

 [id number]  [address] [id number] [id number]  
15 Justo, 2018  Congenital defect of spine Q76.49

 

 [facility]  [address] [id number] [id number]  
  Moderate major depression F32.1

 

 [facility]  [address] [id number] [place] [id number]  
22 May, 2018  Recurrent major depressive disorder, in partial remission F33.41

 

 [facility]  [address] [id number] [id number]  
09 May, 2018  Recurrent major depressive disorder, in partial remission F33.41

 

 [facility]  [address] [id number] [id number]  
04 May, 2018  Moderate major depression F32.1 and Thrush B37.0

 

 [facility]  [address] [id number] [id number]  
   

 

 [facility]  [address]  [address] [id number] [id number]  
  Moderate major depression F32.1

 

 [facility]  [address] [id number] [id number]  
  Moderate major depression F32.1 and Thrush B37.0

 

 [facility]  [facility]  AVE [id number] [place] [id number]  
   

 

 [facility]  [address]00565100[place] [address]    Right acute otitis media H66.91 ; Cough R05 and Tobacco 
abuse Z72.0

 

 [facility] [id number] [place] [id number]  
  Moderate major depression F32.1 and Acute hemorrhoid K64.9

 

 [facility]  [address] [place] [address] AVE [id number] [place] [id number]  
14 Dec, 2017  Moderate major depression F32.1 ; History of asthma Z87.09 ; 
Tobacco use Z72.0 ; Tobacco abuse counseling Z71.6 ; Meralgia paresthetica of 
left side G57.12 ; Sore throat J02.9 and Strep throat J02.0

 

 [facility] [id number] [place] [id number]  
12 Dec, 2017   

 

 [facility]  [address] [place] [phone]    Pharyngitis due to other organism J02.8

 

 [facility]  [address] [place] [phone]
[phone]  28 Mar, 2017   

 

 [facility]  [id number] [place] [address]-
[phone]  14 Mar, 2017  Routine gynecological examination Z01.419

 

 [facility]  [address] [facility] [address] [id number] [place] [id number]  
04 Mar, 2017  Acute recurrent frontal sinusitis J01.11 and Fever and chills 
R50.9

 

 [facility]  [address] [id number], [place] [phone]  10 2017  Fever, unspecified fever cause R50.9 and Acute non-
recurrent maxillary sinusitis J01.00

 

 [facility]  [address] [id number] [address]  03 Oct, 2016   

 

 [facility] [id number] [id number]  
  Strep throat J02.0

 

 [address] [id number] [place] [id number]  
  Conjunctivitis of right eye, unspecified conjunctivitis type 
H10.9 and Strep throat J02.0

 

 [address] [id number] [place] [id number]  
  Sore throat J02.9

 

 [facility]  [address]00565100[place], [place] [address]-
[address]367  17 Dec, 2009   

 

 [facility]  [address]00565100[place], [place] [address]-
5868  17 Dec, 2009   

 

 [facility]  [address] [place] [phone]
[phone]  19 May, 2009   







IMMUNIZATIONS

No Known Immunizations



SOCIAL HISTORY

Never Assessed



REASON FOR VISIT

med [facility] for [facility] b pricing



PLAN OF CARE





VITAL SIGNS





MEDICATIONS







 Medication  Instructions  Dosage  Frequency  Start Date  End Date  Duration  
Status

 

 Aripiprazole 5 mg  Orally Once a day  1 tablet  24h          Active







RESULTS

No Results



PROCEDURES

No Known procedures



INSTRUCTIONS





MEDICATIONS ADMINISTERED

No Known Medications



MEDICAL (GENERAL) HISTORY







 Type  Description  Date

 

 Medical History  asthma   

 

 Medical History  depression ( prozac, pristiq, zoloft, celexa, wellbutrin)   

 

 Medical History  anxiety   

 

 Medical History  Chronic low back pain- missing vertebra   

 

 Medical History  Spina befida - close   

 

 Medical History  Degenerative disc disease   

 

 Surgical History  cholecystectomy   

 

 Hospitalization History  childbirth

## 2018-12-12 NOTE — ANESTHESIA-GENERAL POST-OP
General


Patient Condition


Mental Status/LOC:  Same as Preop


Cardiovascular:  Satisfactory


Nausea/Vomiting:  Absent


Respiratory:  Satisfactory


Pain:  Controlled


Complications:  Absent





Post Op Complications


Complications


None





Follow Up Care/Instructions


Patient Instructions


None needed.





Anesthesia/Patient Condition


Patient Condition


Patient is already discharged to home. No complications noted per nursing staff.











JCARLOS CERRATO DO Dec 12, 2018 14:01